# Patient Record
Sex: MALE | Race: WHITE | Employment: FULL TIME | URBAN - METROPOLITAN AREA
[De-identification: names, ages, dates, MRNs, and addresses within clinical notes are randomized per-mention and may not be internally consistent; named-entity substitution may affect disease eponyms.]

---

## 2017-04-20 ENCOUNTER — OFFICE VISIT (OUTPATIENT)
Dept: FAMILY MEDICINE CLINIC | Facility: CLINIC | Age: 47
End: 2017-04-20

## 2017-04-20 VITALS
BODY MASS INDEX: 48.37 KG/M2 | WEIGHT: 308.19 LBS | HEART RATE: 76 BPM | TEMPERATURE: 98 F | HEIGHT: 66.75 IN | SYSTOLIC BLOOD PRESSURE: 160 MMHG | RESPIRATION RATE: 18 BRPM | DIASTOLIC BLOOD PRESSURE: 110 MMHG

## 2017-04-20 DIAGNOSIS — R73.01 IMPAIRED FASTING GLUCOSE: ICD-10-CM

## 2017-04-20 DIAGNOSIS — I10 ESSENTIAL HYPERTENSION WITH GOAL BLOOD PRESSURE LESS THAN 140/90: Primary | ICD-10-CM

## 2017-04-20 DIAGNOSIS — R07.89 LEFT-SIDED CHEST WALL PAIN: ICD-10-CM

## 2017-04-20 DIAGNOSIS — E78.1 HYPERTRIGLYCERIDEMIA: ICD-10-CM

## 2017-04-20 PROCEDURE — 99213 OFFICE O/P EST LOW 20 MIN: CPT | Performed by: FAMILY MEDICINE

## 2017-04-20 RX ORDER — METOPROLOL SUCCINATE 100 MG/1
100 TABLET, EXTENDED RELEASE ORAL DAILY
Qty: 90 TABLET | Refills: 3 | Status: SHIPPED | OUTPATIENT
Start: 2017-04-20 | End: 2018-05-16

## 2017-04-20 RX ORDER — TRAMADOL HYDROCHLORIDE 50 MG/1
50 TABLET ORAL EVERY 6 HOURS PRN
Qty: 30 TABLET | Refills: 1 | Status: SHIPPED | OUTPATIENT
Start: 2017-04-20 | End: 2017-07-06

## 2017-04-21 NOTE — PROGRESS NOTES
Here to follow-up on hypertension. He has been having a pain in the left chest.  It is worse when he rotates his neck or flexes to the left. Has not noticed any rashing or bruising.   There is no shortness of breath but the pain can be worse with taking a uncontrolled #2 left sided chest wall pain with tenderness and increased pain with rotation of the neck. Plans increase metoprolol XL to 100 mg daily. Get some blood pressure readings in 1-2 weeks. Make sure the cuff its improperly.   I do not want to

## 2017-07-06 ENCOUNTER — OFFICE VISIT (OUTPATIENT)
Dept: FAMILY MEDICINE CLINIC | Facility: CLINIC | Age: 47
End: 2017-07-06

## 2017-07-06 VITALS
WEIGHT: 298 LBS | HEART RATE: 74 BPM | SYSTOLIC BLOOD PRESSURE: 138 MMHG | BODY MASS INDEX: 46.77 KG/M2 | RESPIRATION RATE: 16 BRPM | DIASTOLIC BLOOD PRESSURE: 86 MMHG | HEIGHT: 67 IN | TEMPERATURE: 98 F

## 2017-07-06 DIAGNOSIS — R10.9 RIGHT SIDED ABDOMINAL PAIN: Primary | ICD-10-CM

## 2017-07-06 DIAGNOSIS — L82.1 SEBORRHEIC KERATOSIS: ICD-10-CM

## 2017-07-06 DIAGNOSIS — R73.01 IMPAIRED FASTING GLUCOSE: ICD-10-CM

## 2017-07-06 DIAGNOSIS — E78.1 HYPERTRIGLYCERIDEMIA: ICD-10-CM

## 2017-07-06 DIAGNOSIS — I10 ESSENTIAL HYPERTENSION: ICD-10-CM

## 2017-07-06 DIAGNOSIS — Z87.19 H/O DIVERTICULITIS OF COLON: ICD-10-CM

## 2017-07-06 PROCEDURE — 99213 OFFICE O/P EST LOW 20 MIN: CPT | Performed by: FAMILY MEDICINE

## 2017-07-06 RX ORDER — DOXYCYCLINE HYCLATE 100 MG
100 TABLET ORAL 2 TIMES DAILY
Qty: 20 TABLET | Refills: 0 | Status: SHIPPED | COMMUNITY
Start: 2017-07-06 | End: 2018-01-04 | Stop reason: ALTCHOICE

## 2017-07-06 RX ORDER — CIPROFLOXACIN 500 MG/1
500 TABLET, FILM COATED ORAL 2 TIMES DAILY
Qty: 20 TABLET | Refills: 0 | Status: SHIPPED | COMMUNITY
Start: 2017-07-06 | End: 2018-01-04 | Stop reason: ALTCHOICE

## 2017-07-07 ENCOUNTER — TELEPHONE (OUTPATIENT)
Dept: FAMILY MEDICINE CLINIC | Facility: CLINIC | Age: 47
End: 2017-07-07

## 2017-07-07 NOTE — PROGRESS NOTES
Here with an episode of pain yesterday on the lateral right abdomen. Try to have a bowel movement and it was difficult. No blood in the stool. Has not eaten since this episode. Therefore no additional bowel movements today.   Pain is a very mild ache at nontender over the spine. No CVA tenderness. Laying back he did get some pain in a similar area but felt more muscular. Abdomen soft mild tenderness in the right lower quadrant. No rebound or guarding. No masses. Positive bowel sounds.   No hyperactiv

## 2017-07-07 NOTE — TELEPHONE ENCOUNTER
after appt had dinner, had more pain in his side. This morning he had prescription filled and started the antibiotics.  Please call new number [phone] JUANL

## 2017-07-07 NOTE — PATIENT INSTRUCTIONS
Fast 8 hours for labs. Water, black coffee, or plain tea only. Any sugar in the system will alter the glucose level and triglyceride level. Understanding Seborrheic Keratosis  Seborrheic keratoses are wart-like growths on the skin.  These growths are n Seborrheic keratoses are not cancer, but they can look like some types of skin cancer. So it’s a good idea to ask your healthcare provider to check any new skin growths. Ask your healthcare provider about any skin problem that concerns you.   When should I

## 2017-07-18 ENCOUNTER — TELEPHONE (OUTPATIENT)
Dept: FAMILY MEDICINE CLINIC | Facility: CLINIC | Age: 47
End: 2017-07-18

## 2017-07-18 RX ORDER — CIPROFLOXACIN 500 MG/1
500 TABLET, FILM COATED ORAL 2 TIMES DAILY
Qty: 10 TABLET | Refills: 0 | Status: SHIPPED | OUTPATIENT
Start: 2017-07-18 | End: 2017-07-23

## 2017-07-18 NOTE — TELEPHONE ENCOUNTER
Patient states that he was given an antibiotic and is now finished with it and is not feeling all the way better. He would like to know if there is an additional or stronger antibiotic that can be called in. Would like a call back.

## 2017-07-18 NOTE — TELEPHONE ENCOUNTER
See attached message. Last OV RC 7-6-17. Spoke with pt, \"feels pretty good, ABTX have helped significantly\". \"Feels the spot, but not really pain\". No problems with BM, no fever, no nausea. Pt inquired if RC thinks it could be still not cleared up.

## 2018-01-04 ENCOUNTER — OFFICE VISIT (OUTPATIENT)
Dept: FAMILY MEDICINE CLINIC | Facility: CLINIC | Age: 48
End: 2018-01-04

## 2018-01-04 VITALS
BODY MASS INDEX: 48.97 KG/M2 | WEIGHT: 312 LBS | SYSTOLIC BLOOD PRESSURE: 134 MMHG | HEIGHT: 67 IN | RESPIRATION RATE: 18 BRPM | HEART RATE: 68 BPM | OXYGEN SATURATION: 98 % | DIASTOLIC BLOOD PRESSURE: 88 MMHG | TEMPERATURE: 98 F

## 2018-01-04 DIAGNOSIS — S46.812A TRAPEZIUS STRAIN, LEFT, INITIAL ENCOUNTER: Primary | ICD-10-CM

## 2018-01-04 PROCEDURE — 99213 OFFICE O/P EST LOW 20 MIN: CPT | Performed by: FAMILY MEDICINE

## 2018-01-04 RX ORDER — CYCLOBENZAPRINE HCL 5 MG
5 TABLET ORAL 3 TIMES DAILY PRN
Qty: 20 TABLET | Refills: 0 | Status: SHIPPED | OUTPATIENT
Start: 2018-01-04 | End: 2018-01-11

## 2018-01-04 NOTE — PROGRESS NOTES
7 days of pains going down the left arm. He cannot hold pressure and make them feel better. He works at a desk job. Drives about an hour. There is been no trauma. He denies headaches or dizziness.   There is been no numbness tingling or weakness in the arm.    Plans cyclobenzaprine 5 mg 3 times a day. See how he tolerates it tonight. If he is at all drowsy do not take it during the day while he has to drive. Instead take 2 at bedtime the next 3 nights. Let me know on Monday how he is doing.   If he is

## 2018-01-04 NOTE — PATIENT INSTRUCTIONS
Try the first dose tonight, if it makes you drowsy do not take them during the day while driving and instead take 2 tomorrow night. If the schedule allows take them 3 times a day Saturday and Sunday.   If your schedule does not allow take them again 2 tabl

## 2018-05-16 ENCOUNTER — TELEPHONE (OUTPATIENT)
Dept: FAMILY MEDICINE CLINIC | Facility: CLINIC | Age: 48
End: 2018-05-16

## 2018-05-16 DIAGNOSIS — R73.01 IMPAIRED FASTING GLUCOSE: Primary | ICD-10-CM

## 2018-05-16 DIAGNOSIS — I10 ESSENTIAL HYPERTENSION: ICD-10-CM

## 2018-05-16 DIAGNOSIS — I10 ESSENTIAL HYPERTENSION WITH GOAL BLOOD PRESSURE LESS THAN 140/90: ICD-10-CM

## 2018-05-16 RX ORDER — METOPROLOL SUCCINATE 100 MG/1
100 TABLET, EXTENDED RELEASE ORAL DAILY
Qty: 30 TABLET | Refills: 0 | Status: SHIPPED | OUTPATIENT
Start: 2018-05-16 | End: 2018-06-29

## 2018-05-16 NOTE — TELEPHONE ENCOUNTER
Scheduled pt for 18. Sent in lab orders that  to [address], fasting instructions given. Sent 30 day RX to pt's pharmacy. Task completed.

## 2018-05-16 NOTE — TELEPHONE ENCOUNTER
Has no more refills on his blood pressure med. Does he need to come in for an appt? Does he need blood work before an appt if one is needed?   He uses [facility] if he can have refills

## 2018-05-17 ENCOUNTER — APPOINTMENT (OUTPATIENT)
Dept: LAB | Age: 48
End: 2018-05-17
Attending: FAMILY MEDICINE
Payer: COMMERCIAL

## 2018-05-17 DIAGNOSIS — I10 ESSENTIAL HYPERTENSION WITH GOAL BLOOD PRESSURE LESS THAN 140/90: ICD-10-CM

## 2018-05-17 DIAGNOSIS — R73.01 IMPAIRED FASTING GLUCOSE: ICD-10-CM

## 2018-05-17 PROCEDURE — 80053 COMPREHEN METABOLIC PANEL: CPT | Performed by: FAMILY MEDICINE

## 2018-05-17 PROCEDURE — 80061 LIPID PANEL: CPT | Performed by: FAMILY MEDICINE

## 2018-05-17 PROCEDURE — 83036 HEMOGLOBIN GLYCOSYLATED A1C: CPT | Performed by: FAMILY MEDICINE

## 2018-05-18 ENCOUNTER — TELEPHONE (OUTPATIENT)
Dept: FAMILY MEDICINE CLINIC | Facility: CLINIC | Age: 48
End: 2018-05-18

## 2018-05-18 DIAGNOSIS — R79.89 ELEVATED SERUM CREATININE: Primary | ICD-10-CM

## 2018-05-18 NOTE — TELEPHONE ENCOUNTER
----- Message from Praveen Alfaro MD sent at 5/18/2018 11:19 AM CDT -----  3 month sugar test consistent with impaired fasting glucose. His glucose and triglycerides are improved. His kidney function has bumped way up.   Ask him to repeat the CMP before

## 2018-06-27 ENCOUNTER — APPOINTMENT (OUTPATIENT)
Dept: LAB | Age: 48
End: 2018-06-27
Attending: FAMILY MEDICINE
Payer: COMMERCIAL

## 2018-06-27 DIAGNOSIS — R79.89 ELEVATED SERUM CREATININE: ICD-10-CM

## 2018-06-27 PROCEDURE — 80053 COMPREHEN METABOLIC PANEL: CPT | Performed by: FAMILY MEDICINE

## 2018-06-27 PROCEDURE — 36415 COLL VENOUS BLD VENIPUNCTURE: CPT | Performed by: FAMILY MEDICINE

## 2018-06-29 ENCOUNTER — OFFICE VISIT (OUTPATIENT)
Dept: FAMILY MEDICINE CLINIC | Facility: CLINIC | Age: 48
End: 2018-06-29

## 2018-06-29 VITALS
SYSTOLIC BLOOD PRESSURE: 146 MMHG | DIASTOLIC BLOOD PRESSURE: 86 MMHG | OXYGEN SATURATION: 97 % | HEIGHT: 67 IN | HEART RATE: 91 BPM | BODY MASS INDEX: 46.77 KG/M2 | TEMPERATURE: 98 F | RESPIRATION RATE: 20 BRPM | WEIGHT: 298 LBS

## 2018-06-29 DIAGNOSIS — R73.01 IMPAIRED FASTING GLUCOSE: ICD-10-CM

## 2018-06-29 DIAGNOSIS — I10 ESSENTIAL HYPERTENSION: Primary | ICD-10-CM

## 2018-06-29 DIAGNOSIS — E78.1 HYPERTRIGLYCERIDEMIA: ICD-10-CM

## 2018-06-29 DIAGNOSIS — R79.89 ELEVATED SERUM CREATININE: ICD-10-CM

## 2018-06-29 PROCEDURE — 99213 OFFICE O/P EST LOW 20 MIN: CPT | Performed by: FAMILY MEDICINE

## 2018-06-29 RX ORDER — METOPROLOL TARTRATE AND HYDROCHLOROTHIAZIDE 100; 25 MG/1; MG/1
1 TABLET ORAL DAILY
Qty: 90 TABLET | Refills: 3 | Status: SHIPPED | OUTPATIENT
Start: 2018-06-29 | End: 2019-06-30

## 2018-06-29 NOTE — PATIENT INSTRUCTIONS
Labs Late Sept or early October. No fasting required. Water pill may cause increased urination for a few weeks.     Goal less than 140/90

## 2018-06-29 NOTE — PROGRESS NOTES
Here for follow-up of hypertension. Had labs done a month ago. Creatinine was 1.8. Had labs repeated 2 days ago. Creatinine down to 1.7. He denies excessive headaches or dizziness. No chest pain or heart palpitations.   He has been rationing the [employer]

## 2018-10-01 ENCOUNTER — APPOINTMENT (OUTPATIENT)
Dept: LAB | Age: 48
End: 2018-10-01
Attending: FAMILY MEDICINE
Payer: COMMERCIAL

## 2018-10-01 DIAGNOSIS — I10 ESSENTIAL HYPERTENSION: ICD-10-CM

## 2018-10-01 DIAGNOSIS — R73.01 IMPAIRED FASTING GLUCOSE: ICD-10-CM

## 2018-10-01 DIAGNOSIS — E78.1 HYPERTRIGLYCERIDEMIA: ICD-10-CM

## 2018-10-01 PROCEDURE — 83036 HEMOGLOBIN GLYCOSYLATED A1C: CPT | Performed by: FAMILY MEDICINE

## 2018-10-01 PROCEDURE — 36415 COLL VENOUS BLD VENIPUNCTURE: CPT | Performed by: FAMILY MEDICINE

## 2018-10-01 PROCEDURE — 80053 COMPREHEN METABOLIC PANEL: CPT | Performed by: FAMILY MEDICINE

## 2018-12-14 ENCOUNTER — OFFICE VISIT (OUTPATIENT)
Dept: FAMILY MEDICINE CLINIC | Facility: CLINIC | Age: 48
End: 2018-12-14
Payer: COMMERCIAL

## 2018-12-14 VITALS
BODY MASS INDEX: 46.46 KG/M2 | HEART RATE: 73 BPM | WEIGHT: 296 LBS | SYSTOLIC BLOOD PRESSURE: 120 MMHG | OXYGEN SATURATION: 98 % | RESPIRATION RATE: 18 BRPM | DIASTOLIC BLOOD PRESSURE: 88 MMHG | HEIGHT: 67 IN

## 2018-12-14 DIAGNOSIS — E78.1 HYPERTRIGLYCERIDEMIA: ICD-10-CM

## 2018-12-14 DIAGNOSIS — R94.31 ABNORMAL EKG: ICD-10-CM

## 2018-12-14 DIAGNOSIS — I10 ESSENTIAL HYPERTENSION: ICD-10-CM

## 2018-12-14 DIAGNOSIS — R07.89 PRESSURE IN LEFT SIDE OF CHEST: Primary | ICD-10-CM

## 2018-12-14 PROCEDURE — 93000 ELECTROCARDIOGRAM COMPLETE: CPT | Performed by: FAMILY MEDICINE

## 2018-12-14 PROCEDURE — 99213 OFFICE O/P EST LOW 20 MIN: CPT | Performed by: FAMILY MEDICINE

## 2018-12-14 NOTE — PATIENT INSTRUCTIONS
Start aspirin 81 mg first dose tonight. Nuclear stress test.    If you develop any additional chest pressure, sensation of feeling off in the head, or left arm pain; you should proceed to the ER immediately.

## 2018-12-17 ENCOUNTER — HOSPITAL ENCOUNTER (OUTPATIENT)
Dept: CV DIAGNOSTICS | Facility: HOSPITAL | Age: 48
Discharge: HOME | End: 2018-12-17
Attending: FAMILY MEDICINE
Payer: COMMERCIAL

## 2018-12-17 DIAGNOSIS — I10 ESSENTIAL HYPERTENSION: ICD-10-CM

## 2018-12-17 DIAGNOSIS — R07.89 PRESSURE IN LEFT SIDE OF CHEST: ICD-10-CM

## 2018-12-17 DIAGNOSIS — E78.1 HYPERTRIGLYCERIDEMIA: ICD-10-CM

## 2018-12-17 DIAGNOSIS — R94.31 ABNORMAL EKG: ICD-10-CM

## 2018-12-17 PROCEDURE — 78452 HT MUSCLE IMAGE SPECT MULT: CPT | Performed by: FAMILY MEDICINE

## 2018-12-17 PROCEDURE — 93017 CV STRESS TEST TRACING ONLY: CPT | Performed by: FAMILY MEDICINE

## 2018-12-17 PROCEDURE — 93018 CV STRESS TEST I&R ONLY: CPT | Performed by: FAMILY MEDICINE

## 2018-12-20 ENCOUNTER — TELEPHONE (OUTPATIENT)
Dept: FAMILY MEDICINE CLINIC | Facility: CLINIC | Age: 48
End: 2018-12-20

## 2018-12-20 NOTE — TELEPHONE ENCOUNTER
Pt inquiring about NM Stress Test results. Advised pt final report not yet received, read by [facility] and this is normal time range for getting results. Advised with route to [facility] to inform. Results only transcribed, not finalized.

## 2018-12-20 NOTE — TELEPHONE ENCOUNTER
Pt informed of both test results and recommendations and he expressed understanding and agreement. Gave contact information for the Cardiac practice. Task completed.

## 2018-12-20 NOTE — TELEPHONE ENCOUNTER
Stress test is normal.  Given his symptoms and ekg changes, I would like him to see cardiology,  Jessica Bob, etc.

## 2018-12-21 ENCOUNTER — PATIENT MESSAGE (OUTPATIENT)
Dept: FAMILY MEDICINE CLINIC | Facility: CLINIC | Age: 48
End: 2018-12-21

## 2018-12-21 NOTE — TELEPHONE ENCOUNTER
From: Tj Verdin  To: Marcos Herndon MD  Sent: 12/21/2018 1:58 PM CST  Subject: Non-Urgent Medical Question    My wife is convinced that I have sleep apnea, and feels strongly that I need this condition addressed.   She thinks it may be a contributin

## 2019-06-30 DIAGNOSIS — I10 ESSENTIAL HYPERTENSION: ICD-10-CM

## 2019-07-01 RX ORDER — METOPROLOL TARTRATE AND HYDROCHLOROTHIAZIDE 100; 25 MG/1; MG/1
TABLET ORAL
Qty: 90 TABLET | Refills: 0 | Status: SHIPPED | OUTPATIENT
Start: 2019-07-01 | End: 2019-10-07

## 2019-09-06 ENCOUNTER — OFFICE VISIT (OUTPATIENT)
Dept: FAMILY MEDICINE CLINIC | Facility: CLINIC | Age: 49
End: 2019-09-06
Payer: COMMERCIAL

## 2019-09-06 VITALS
SYSTOLIC BLOOD PRESSURE: 120 MMHG | RESPIRATION RATE: 18 BRPM | OXYGEN SATURATION: 98 % | WEIGHT: 299 LBS | DIASTOLIC BLOOD PRESSURE: 80 MMHG | HEART RATE: 87 BPM | HEIGHT: 67 IN | BODY MASS INDEX: 46.93 KG/M2

## 2019-09-06 DIAGNOSIS — I10 ESSENTIAL HYPERTENSION: Primary | ICD-10-CM

## 2019-09-06 DIAGNOSIS — R06.83 SNORING: ICD-10-CM

## 2019-09-06 DIAGNOSIS — R06.81 APNEA: ICD-10-CM

## 2019-09-06 PROCEDURE — 99213 OFFICE O/P EST LOW 20 MIN: CPT | Performed by: FAMILY MEDICINE

## 2019-09-06 NOTE — PROGRESS NOTES
Wife is concerned about snoring and apnea spells. He is treated for hypertension which is why he came in today. He does have daytime drowsiness. Blood pressures been under good control. No complaints of side effects.     PAST MEDICAL HISTORY:  Past Me

## 2019-09-28 ENCOUNTER — OFFICE VISIT (OUTPATIENT)
Dept: SLEEP CENTER | Age: 49
End: 2019-09-28
Attending: INTERNAL MEDICINE
Payer: COMMERCIAL

## 2019-09-28 DIAGNOSIS — R06.81 APNEA: ICD-10-CM

## 2019-09-28 DIAGNOSIS — R06.83 SNORING: ICD-10-CM

## 2019-09-28 DIAGNOSIS — I10 ESSENTIAL HYPERTENSION: ICD-10-CM

## 2019-09-28 PROCEDURE — 95811 POLYSOM 6/>YRS CPAP 4/> PARM: CPT

## 2019-10-07 DIAGNOSIS — I10 ESSENTIAL HYPERTENSION: ICD-10-CM

## 2019-10-07 PROBLEM — G47.30 SLEEP APNEA IN ADULT: Status: ACTIVE | Noted: 2019-10-07

## 2019-10-07 RX ORDER — METOPROLOL TARTRATE AND HYDROCHLOROTHIAZIDE 100; 25 MG/1; MG/1
TABLET ORAL
Qty: 90 TABLET | Refills: 0 | Status: SHIPPED | OUTPATIENT
Start: 2019-10-07 | End: 2019-12-30

## 2019-12-30 DIAGNOSIS — I10 ESSENTIAL HYPERTENSION: ICD-10-CM

## 2019-12-30 RX ORDER — METOPROLOL TARTRATE AND HYDROCHLOROTHIAZIDE 100; 25 MG/1; MG/1
TABLET ORAL
Qty: 90 TABLET | Refills: 0 | Status: SHIPPED | OUTPATIENT
Start: 2019-12-30 | End: 2020-06-29

## 2020-01-02 RX ORDER — HYDROCHLOROTHIAZIDE 25 MG/1
25 TABLET ORAL DAILY
Qty: 90 TABLET | Refills: 0 | Status: SHIPPED | OUTPATIENT
Start: 2020-01-02 | End: 2020-03-27

## 2020-03-27 RX ORDER — HYDROCHLOROTHIAZIDE 25 MG/1
TABLET ORAL
Qty: 90 TABLET | Refills: 0 | Status: SHIPPED | OUTPATIENT
Start: 2020-03-27 | End: 2020-06-18

## 2020-06-18 RX ORDER — HYDROCHLOROTHIAZIDE 25 MG/1
TABLET ORAL
Qty: 90 TABLET | Refills: 0 | Status: SHIPPED | OUTPATIENT
Start: 2020-06-18 | End: 2020-09-18

## 2020-06-24 RX ORDER — METOPROLOL SUCCINATE 100 MG/1
TABLET, EXTENDED RELEASE ORAL
Qty: 90 TABLET | Refills: 1 | Status: SHIPPED | OUTPATIENT
Start: 2020-06-24 | End: 2020-12-15

## 2020-06-29 ENCOUNTER — VIRTUAL PHONE E/M (OUTPATIENT)
Dept: FAMILY MEDICINE CLINIC | Facility: CLINIC | Age: 50
End: 2020-06-29
Payer: COMMERCIAL

## 2020-06-29 DIAGNOSIS — I10 ESSENTIAL HYPERTENSION: Primary | ICD-10-CM

## 2020-06-29 DIAGNOSIS — E78.1 HYPERTRIGLYCERIDEMIA: ICD-10-CM

## 2020-06-29 DIAGNOSIS — R73.01 IMPAIRED FASTING GLUCOSE: ICD-10-CM

## 2020-06-29 PROCEDURE — 99213 OFFICE O/P EST LOW 20 MIN: CPT | Performed by: FAMILY MEDICINE

## 2020-06-29 NOTE — PROGRESS NOTES
Virtual Telephone Check-In    Rafa Mayers verbally consents to a Virtual/Telephone Check-In visit on 06/29/20. Patient understands and accepts financial responsibility for any deductible, co-insurance and/or co-pays associated with this service. visit:    Essential hypertension  -     COMP METABOLIC PANEL (14); Future  Continue metoprolol. Discontinue hydrochlorothiazide due to gout flareups every 3 weeks. Start checking blood pressures after 3 or 4 days. Goal to stay below 140/90.   Stress test

## 2020-07-02 ENCOUNTER — APPOINTMENT (OUTPATIENT)
Dept: LAB | Age: 50
End: 2020-07-02
Attending: FAMILY MEDICINE
Payer: COMMERCIAL

## 2020-07-02 DIAGNOSIS — I10 ESSENTIAL HYPERTENSION: ICD-10-CM

## 2020-07-02 DIAGNOSIS — R73.01 IMPAIRED FASTING GLUCOSE: ICD-10-CM

## 2020-07-02 DIAGNOSIS — E78.1 HYPERTRIGLYCERIDEMIA: ICD-10-CM

## 2020-07-02 LAB
ALBUMIN SERPL-MCNC: 3.4 G/DL (ref 3.4–5)
ALBUMIN/GLOB SERPL: 0.9 {RATIO} (ref 1–2)
ALP LIVER SERPL-CCNC: 72 U/L (ref 45–117)
ALT SERPL-CCNC: 19 U/L (ref 16–61)
ANION GAP SERPL CALC-SCNC: 5 MMOL/L (ref 0–18)
AST SERPL-CCNC: 17 U/L (ref 15–37)
BILIRUB SERPL-MCNC: 0.6 MG/DL (ref 0.1–2)
BUN BLD-MCNC: 17 MG/DL (ref 7–18)
BUN/CREAT SERPL: 10.1 (ref 10–20)
CALCIUM BLD-MCNC: 8.6 MG/DL (ref 8.5–10.1)
CHLORIDE SERPL-SCNC: 112 MMOL/L (ref 98–112)
CHOLEST SMN-MCNC: 188 MG/DL (ref ?–200)
CO2 SERPL-SCNC: 26 MMOL/L (ref 21–32)
CREAT BLD-MCNC: 1.69 MG/DL (ref 0.7–1.3)
EST. AVERAGE GLUCOSE BLD GHB EST-MCNC: 123 MG/DL (ref 68–126)
GLOBULIN PLAS-MCNC: 3.8 G/DL (ref 2.8–4.4)
GLUCOSE BLD-MCNC: 103 MG/DL (ref 70–99)
HBA1C MFR BLD HPLC: 5.9 % (ref ?–5.7)
HDLC SERPL-MCNC: 27 MG/DL (ref 40–59)
LDLC SERPL CALC-MCNC: 129 MG/DL (ref ?–100)
M PROTEIN MFR SERPL ELPH: 7.2 G/DL (ref 6.4–8.2)
NONHDLC SERPL-MCNC: 161 MG/DL (ref ?–130)
OSMOLALITY SERPL CALC.SUM OF ELEC: 298 MOSM/KG (ref 275–295)
PATIENT FASTING Y/N/NP: YES
PATIENT FASTING Y/N/NP: YES
POTASSIUM SERPL-SCNC: 3.8 MMOL/L (ref 3.5–5.1)
SODIUM SERPL-SCNC: 143 MMOL/L (ref 136–145)
TRIGL SERPL-MCNC: 160 MG/DL (ref 30–149)
VLDLC SERPL CALC-MCNC: 32 MG/DL (ref 0–30)

## 2020-07-02 PROCEDURE — 83036 HEMOGLOBIN GLYCOSYLATED A1C: CPT | Performed by: FAMILY MEDICINE

## 2020-07-02 PROCEDURE — 80061 LIPID PANEL: CPT | Performed by: FAMILY MEDICINE

## 2020-07-02 PROCEDURE — 36415 COLL VENOUS BLD VENIPUNCTURE: CPT | Performed by: FAMILY MEDICINE

## 2020-07-02 PROCEDURE — 80053 COMPREHEN METABOLIC PANEL: CPT | Performed by: FAMILY MEDICINE

## 2020-09-18 RX ORDER — HYDROCHLOROTHIAZIDE 25 MG/1
TABLET ORAL
Qty: 90 TABLET | Refills: 0 | Status: SHIPPED | OUTPATIENT
Start: 2020-09-18 | End: 2020-12-15

## 2020-12-12 ENCOUNTER — APPOINTMENT (OUTPATIENT)
Dept: GENERAL RADIOLOGY | Age: 50
End: 2020-12-12
Attending: NURSE PRACTITIONER
Payer: COMMERCIAL

## 2020-12-12 ENCOUNTER — HOSPITAL ENCOUNTER (OUTPATIENT)
Age: 50
Discharge: HOME | End: 2020-12-12
Attending: EMERGENCY MEDICINE
Payer: COMMERCIAL

## 2020-12-12 VITALS
DIASTOLIC BLOOD PRESSURE: 100 MMHG | RESPIRATION RATE: 20 BRPM | HEART RATE: 78 BPM | TEMPERATURE: 97 F | OXYGEN SATURATION: 97 % | SYSTOLIC BLOOD PRESSURE: 154 MMHG

## 2020-12-12 DIAGNOSIS — M54.12 CERVICAL RADICULOPATHY: Primary | ICD-10-CM

## 2020-12-12 PROCEDURE — 36415 COLL VENOUS BLD VENIPUNCTURE: CPT

## 2020-12-12 PROCEDURE — 93010 ELECTROCARDIOGRAM REPORT: CPT

## 2020-12-12 PROCEDURE — 99205 OFFICE O/P NEW HI 60 MIN: CPT

## 2020-12-12 PROCEDURE — 93005 ELECTROCARDIOGRAM TRACING: CPT

## 2020-12-12 PROCEDURE — 84484 ASSAY OF TROPONIN QUANT: CPT

## 2020-12-12 PROCEDURE — 72050 X-RAY EXAM NECK SPINE 4/5VWS: CPT | Performed by: NURSE PRACTITIONER

## 2020-12-12 PROCEDURE — 99215 OFFICE O/P EST HI 40 MIN: CPT

## 2020-12-12 RX ORDER — METHYLPREDNISOLONE 4 MG/1
TABLET ORAL
Qty: 1 PACKAGE | Refills: 0 | Status: SHIPPED | OUTPATIENT
Start: 2020-12-12 | End: 2021-06-24 | Stop reason: ALTCHOICE

## 2020-12-12 RX ORDER — CYCLOBENZAPRINE HCL 10 MG
10 TABLET ORAL 3 TIMES DAILY PRN
Qty: 20 TABLET | Refills: 0 | Status: SHIPPED | OUTPATIENT
Start: 2020-12-12 | End: 2020-12-19

## 2020-12-12 NOTE — ED PROVIDER NOTES
For 2 or 3 days, patient has had neck pain rating down his left arm. Patient states that he has had difficulty finding a comfortable position. He notes both flexion and extension of his neck because of the discomfort to be worse.   This morning, he was fe to Lexiscan infusion. 3. Normal LVEF 63%, no wall motion abnormalities. 4. Normal heart rate and blood pressure response. 5. No arrhythmias.      The radicular pain in his neck suggests the possibility of cervical disc disease    X-ray C-spine  CONCLUS

## 2020-12-12 NOTE — ED INITIAL ASSESSMENT (HPI)
Left side neck and left shoulder - pain started Wednesday worse for the past  2 days, could not get a comfortable position and cannot sleep. Takes ibuprofen and aspirin but no relief. Denies any injury,    Left arm pain  - neck pain radiates to left arm .

## 2020-12-12 NOTE — ED PROVIDER NOTES
Patient Seen in: [facility]      History   Patient presents with:  Neck Pain  Shoulder Pain    Stated Complaint: NECK/SHOULDER PAIN X 2 DAYS    HPI  71-year-old male presents to the immediate care complaining of 2 to 3 days of neck pain t ill-appearing or toxic-appearing. HENT:      Head: Normocephalic and atraumatic. Eyes:      Conjunctiva/sclera: Conjunctivae normal.   Neck:      Musculoskeletal: Normal range of motion and neck supple.  Muscular tenderness (Left sided paraspinal into t by (CST): Silvia Camara MD on 12/12/2020 at 10:38 AM         MDM      Patient appears to have cervical radiculopathy at this time. There is no numbness or weakness to the left arm.   Patient will be given steroids along with muscle relaxants and instructed on

## 2020-12-15 ENCOUNTER — TELEMEDICINE (OUTPATIENT)
Dept: FAMILY MEDICINE CLINIC | Facility: CLINIC | Age: 50
End: 2020-12-15
Payer: COMMERCIAL

## 2020-12-15 DIAGNOSIS — I10 ESSENTIAL HYPERTENSION: ICD-10-CM

## 2020-12-15 DIAGNOSIS — M47.812 ARTHRITIS OF NECK: Primary | ICD-10-CM

## 2020-12-15 DIAGNOSIS — M51.9 INTERVERTEBRAL DISK DISEASE: ICD-10-CM

## 2020-12-15 PROCEDURE — 99213 OFFICE O/P EST LOW 20 MIN: CPT | Performed by: FAMILY MEDICINE

## 2020-12-15 RX ORDER — METOPROLOL SUCCINATE 100 MG/1
100 TABLET, EXTENDED RELEASE ORAL DAILY
Qty: 90 TABLET | Refills: 1 | Status: SHIPPED | OUTPATIENT
Start: 2020-12-15 | End: 2020-12-21

## 2020-12-15 NOTE — PROGRESS NOTES
Virtual Telephone Check-In    Rafa Mayers verbally consents to a Virtual/Telephone Check-In visit on 12/15/20. Patient understands and accepts financial responsibility for any deductible, co-insurance and/or co-pays associated with this service. Lisinopril  FAMILY HISTORY  Family History   Problem Relation Age of Onset   • Diabetes Paternal Grandmother    • Heart Disorder Paternal Grandfather    • Hypertension Father        PHYSICAL EXAM:  There were no vitals taken for this visit.   No conversatio

## 2020-12-19 DIAGNOSIS — I10 ESSENTIAL HYPERTENSION: ICD-10-CM

## 2020-12-21 ENCOUNTER — PATIENT MESSAGE (OUTPATIENT)
Dept: FAMILY MEDICINE CLINIC | Facility: CLINIC | Age: 50
End: 2020-12-21

## 2020-12-21 DIAGNOSIS — M50.30 DEGENERATIVE DISC DISEASE, CERVICAL: Primary | ICD-10-CM

## 2020-12-21 RX ORDER — METOPROLOL SUCCINATE 100 MG/1
TABLET, EXTENDED RELEASE ORAL
Qty: 90 TABLET | Refills: 1 | Status: SHIPPED | OUTPATIENT
Start: 2020-12-21 | End: 2021-12-06

## 2020-12-21 RX ORDER — NABUMETONE 500 MG/1
500 TABLET, FILM COATED ORAL 2 TIMES DAILY
Qty: 20 TABLET | Refills: 0 | Status: SHIPPED | OUTPATIENT
Start: 2020-12-21 | End: 2021-06-24 | Stop reason: ALTCHOICE

## 2020-12-21 NOTE — TELEPHONE ENCOUNTER
Start Relafen 500 mg twice a day for 10 days. MRI cervical spine for the diagnosis of degenerative disc disease.

## 2020-12-21 NOTE — TELEPHONE ENCOUNTER
From: Mireya Verdin  To: Deidre Brady MD  Sent: 12/21/2020 8:12 AM CST  Subject: Other    on our call last week you asked me to report back to you once a few days had gone by since i used up the steroid prescription.   the pain is definitely not as sev

## 2020-12-28 ENCOUNTER — HOSPITAL ENCOUNTER (OUTPATIENT)
Dept: MRI IMAGING | Facility: HOSPITAL | Age: 50
Discharge: HOME | End: 2020-12-28
Attending: FAMILY MEDICINE
Payer: COMMERCIAL

## 2020-12-28 DIAGNOSIS — M50.30 DEGENERATIVE DISC DISEASE, CERVICAL: ICD-10-CM

## 2020-12-28 PROCEDURE — 72141 MRI NECK SPINE W/O DYE: CPT | Performed by: FAMILY MEDICINE

## 2020-12-29 PROBLEM — M50.223 HERNIATED NUCLEUS PULPOSUS, C6-7 LEFT: Status: ACTIVE | Noted: 2020-12-15

## 2021-06-24 ENCOUNTER — OFFICE VISIT (OUTPATIENT)
Dept: FAMILY MEDICINE CLINIC | Facility: CLINIC | Age: 51
End: 2021-06-24
Payer: COMMERCIAL

## 2021-06-24 VITALS
HEIGHT: 67 IN | WEIGHT: 194 LBS | BODY MASS INDEX: 30.45 KG/M2 | RESPIRATION RATE: 16 BRPM | SYSTOLIC BLOOD PRESSURE: 156 MMHG | DIASTOLIC BLOOD PRESSURE: 106 MMHG | OXYGEN SATURATION: 98 % | HEART RATE: 64 BPM

## 2021-06-24 DIAGNOSIS — I10 ESSENTIAL HYPERTENSION: ICD-10-CM

## 2021-06-24 DIAGNOSIS — Z12.11 SCREEN FOR COLON CANCER: ICD-10-CM

## 2021-06-24 DIAGNOSIS — R79.89 ELEVATED SERUM CREATININE: ICD-10-CM

## 2021-06-24 DIAGNOSIS — Z23 NEED FOR DIPHTHERIA-TETANUS-PERTUSSIS (TDAP) VACCINE: ICD-10-CM

## 2021-06-24 DIAGNOSIS — Z00.00 ROUTINE GENERAL MEDICAL EXAMINATION AT A HEALTH CARE FACILITY: Primary | ICD-10-CM

## 2021-06-24 DIAGNOSIS — R73.01 IMPAIRED FASTING GLUCOSE: ICD-10-CM

## 2021-06-24 DIAGNOSIS — Z23 NEED FOR SHINGLES VACCINE: ICD-10-CM

## 2021-06-24 DIAGNOSIS — E78.1 HYPERTRIGLYCERIDEMIA: ICD-10-CM

## 2021-06-24 PROCEDURE — 3008F BODY MASS INDEX DOCD: CPT | Performed by: FAMILY MEDICINE

## 2021-06-24 PROCEDURE — 90750 HZV VACC RECOMBINANT IM: CPT | Performed by: FAMILY MEDICINE

## 2021-06-24 PROCEDURE — 90715 TDAP VACCINE 7 YRS/> IM: CPT | Performed by: FAMILY MEDICINE

## 2021-06-24 PROCEDURE — 3077F SYST BP >= 140 MM HG: CPT | Performed by: FAMILY MEDICINE

## 2021-06-24 PROCEDURE — 90472 IMMUNIZATION ADMIN EACH ADD: CPT | Performed by: FAMILY MEDICINE

## 2021-06-24 PROCEDURE — 3080F DIAST BP >= 90 MM HG: CPT | Performed by: FAMILY MEDICINE

## 2021-06-24 PROCEDURE — 90471 IMMUNIZATION ADMIN: CPT | Performed by: FAMILY MEDICINE

## 2021-06-24 PROCEDURE — 99396 PREV VISIT EST AGE 40-64: CPT | Performed by: FAMILY MEDICINE

## 2021-06-24 NOTE — PATIENT INSTRUCTIONS
Please call [phone] or use [email or web address] to schedule for laboratories or radiology procedures. Fast 8 hours for labs. Water, black coffee, or plain tea only. Any sugar in the system will alter the glucose level and triglyceride level.     Due to the 2

## 2021-06-24 NOTE — PROGRESS NOTES
HPI:  Here for a physical.    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 No complaints of any new constipation or diarrhea. No complaints of abdominal pain or cramping. Regular stools.   GENITOURINARY: No complaints of dysuria, urgency or frequency, no erectile dysfunction  MUSCULOSKELETAL: No complaint supraclavicular areas, axilla, and inguinal areas. EXTREMITIES / MUSCULOSKELETAL: 4 extremities with grossly normal ROM. Gait NL. No cyanosis, clubbing or edema. NEUROLOGIC: CN's II-XII grossly intact, DTR's 2+/4+ throughout. Strength 5+/5+ x 4 ext.  Aler

## 2021-07-08 ENCOUNTER — LAB ENCOUNTER (OUTPATIENT)
Dept: LAB | Age: 51
End: 2021-07-08
Attending: FAMILY MEDICINE
Payer: COMMERCIAL

## 2021-07-08 DIAGNOSIS — E78.1 HYPERTRIGLYCERIDEMIA: ICD-10-CM

## 2021-07-08 DIAGNOSIS — Z00.00 ROUTINE GENERAL MEDICAL EXAMINATION AT A HEALTH CARE FACILITY: ICD-10-CM

## 2021-07-08 DIAGNOSIS — I10 ESSENTIAL HYPERTENSION: ICD-10-CM

## 2021-07-08 DIAGNOSIS — R73.01 IMPAIRED FASTING GLUCOSE: ICD-10-CM

## 2021-07-08 DIAGNOSIS — R79.89 ELEVATED SERUM CREATININE: ICD-10-CM

## 2021-07-08 LAB
ALBUMIN SERPL-MCNC: 3.4 G/DL (ref 3.4–5)
ALBUMIN/GLOB SERPL: 0.9 {RATIO} (ref 1–2)
ALP LIVER SERPL-CCNC: 103 U/L
ALT SERPL-CCNC: 20 U/L
ANION GAP SERPL CALC-SCNC: 8 MMOL/L (ref 0–18)
AST SERPL-CCNC: 12 U/L (ref 15–37)
BILIRUB SERPL-MCNC: 0.4 MG/DL (ref 0.1–2)
BUN BLD-MCNC: 13 MG/DL (ref 7–18)
BUN/CREAT SERPL: 8.5 (ref 10–20)
CALCIUM BLD-MCNC: 8.8 MG/DL (ref 8.5–10.1)
CHLORIDE SERPL-SCNC: 111 MMOL/L (ref 98–112)
CHOLEST SMN-MCNC: 225 MG/DL (ref ?–200)
CO2 SERPL-SCNC: 23 MMOL/L (ref 21–32)
COMPLEXED PSA SERPL-MCNC: 2.48 NG/ML (ref ?–4)
CREAT BLD-MCNC: 1.53 MG/DL
EST. AVERAGE GLUCOSE BLD GHB EST-MCNC: 123 MG/DL (ref 68–126)
GLOBULIN PLAS-MCNC: 3.9 G/DL (ref 2.8–4.4)
GLUCOSE BLD-MCNC: 100 MG/DL (ref 70–99)
HBA1C MFR BLD HPLC: 5.9 % (ref ?–5.7)
HDLC SERPL-MCNC: 36 MG/DL (ref 40–59)
LDLC SERPL CALC-MCNC: 161 MG/DL (ref ?–100)
M PROTEIN MFR SERPL ELPH: 7.3 G/DL (ref 6.4–8.2)
NONHDLC SERPL-MCNC: 189 MG/DL (ref ?–130)
OSMOLALITY SERPL CALC.SUM OF ELEC: 294 MOSM/KG (ref 275–295)
PATIENT FASTING Y/N/NP: YES
PATIENT FASTING Y/N/NP: YES
POTASSIUM SERPL-SCNC: 3.5 MMOL/L (ref 3.5–5.1)
SODIUM SERPL-SCNC: 142 MMOL/L (ref 136–145)
TRIGL SERPL-MCNC: 155 MG/DL (ref 30–149)
VLDLC SERPL CALC-MCNC: 30 MG/DL (ref 0–30)

## 2021-07-08 PROCEDURE — 80053 COMPREHEN METABOLIC PANEL: CPT | Performed by: FAMILY MEDICINE

## 2021-07-08 PROCEDURE — 84153 ASSAY OF PSA TOTAL: CPT | Performed by: FAMILY MEDICINE

## 2021-07-08 PROCEDURE — 83036 HEMOGLOBIN GLYCOSYLATED A1C: CPT | Performed by: FAMILY MEDICINE

## 2021-07-08 PROCEDURE — 80061 LIPID PANEL: CPT | Performed by: FAMILY MEDICINE

## 2021-07-30 ENCOUNTER — HOSPITAL ENCOUNTER (OUTPATIENT)
Facility: HOSPITAL | Age: 51
Setting detail: HOSPITAL OUTPATIENT SURGERY
Discharge: HOME | End: 2021-07-30
Attending: INTERNAL MEDICINE | Admitting: INTERNAL MEDICINE
Payer: COMMERCIAL

## 2021-07-30 ENCOUNTER — ANESTHESIA (OUTPATIENT)
Dept: ENDOSCOPY | Facility: HOSPITAL | Age: 51
End: 2021-07-30
Payer: COMMERCIAL

## 2021-07-30 ENCOUNTER — ANESTHESIA EVENT (OUTPATIENT)
Dept: ENDOSCOPY | Facility: HOSPITAL | Age: 51
End: 2021-07-30
Payer: COMMERCIAL

## 2021-07-30 VITALS
SYSTOLIC BLOOD PRESSURE: 160 MMHG | WEIGHT: 285 LBS | TEMPERATURE: 98 F | HEART RATE: 51 BPM | DIASTOLIC BLOOD PRESSURE: 92 MMHG | HEIGHT: 67 IN | BODY MASS INDEX: 44.73 KG/M2 | RESPIRATION RATE: 18 BRPM | OXYGEN SATURATION: 100 %

## 2021-07-30 DIAGNOSIS — Z12.11 SPECIAL SCREENING FOR MALIGNANT NEOPLASM OF COLON: ICD-10-CM

## 2021-07-30 PROCEDURE — 0DJD8ZZ INSPECTION OF LOWER INTESTINAL TRACT, VIA NATURAL OR ARTIFICIAL OPENING ENDOSCOPIC: ICD-10-PCS | Performed by: INTERNAL MEDICINE

## 2021-07-30 RX ORDER — SODIUM CHLORIDE, SODIUM LACTATE, POTASSIUM CHLORIDE, CALCIUM CHLORIDE 600; 310; 30; 20 MG/100ML; MG/100ML; MG/100ML; MG/100ML
INJECTION, SOLUTION INTRAVENOUS CONTINUOUS
Status: DISCONTINUED | OUTPATIENT
Start: 2021-07-30 | End: 2021-07-30

## 2021-07-30 RX ORDER — NALOXONE HYDROCHLORIDE 0.4 MG/ML
80 INJECTION, SOLUTION INTRAMUSCULAR; INTRAVENOUS; SUBCUTANEOUS AS NEEDED
Status: DISCONTINUED | OUTPATIENT
Start: 2021-07-30 | End: 2021-07-30

## 2021-07-30 RX ORDER — LIDOCAINE HYDROCHLORIDE 10 MG/ML
INJECTION, SOLUTION EPIDURAL; INFILTRATION; INTRACAUDAL; PERINEURAL AS NEEDED
Status: DISCONTINUED | OUTPATIENT
Start: 2021-07-30 | End: 2021-07-30 | Stop reason: SURG

## 2021-07-30 RX ADMIN — SODIUM CHLORIDE, SODIUM LACTATE, POTASSIUM CHLORIDE, CALCIUM CHLORIDE: 600; 310; 30; 20 INJECTION, SOLUTION INTRAVENOUS at 10:49:00

## 2021-07-30 RX ADMIN — LIDOCAINE HYDROCHLORIDE 25 MG: 10 INJECTION, SOLUTION EPIDURAL; INFILTRATION; INTRACAUDAL; PERINEURAL at 10:51:00

## 2021-07-30 NOTE — H&P
History & Physical Examination    Patient Name: Alexander Kang MRN: [id number]  CSN: [id number]  YOB: 1970    Diagnosis: colorectal cancer screen    Present Illness: 45 y/o M history as above presents for Colonoscopy.      PEG 3350-KCl-Na ]    UROGENITAL [ n/a] [ n/a]    EXTREMITIES [ x] [x ]    OTHER        [ x ] I have discussed the risks and benefits and alternatives with the patient/family. They understand and agree to proceed with plan of care.   [ x ] I have reviewed the History and P

## 2021-07-30 NOTE — ANESTHESIA POSTPROCEDURE EVALUATION
Monty We-07-A 1498.  Patient Status:  Hospital Outpatient Surgery   Age/Gender 46year old male MRN [id number]   Location [id number] [place] [address] Attending Miky Ochoa, [address] [facility] Day # 0 PCP MD Tana Izquierdo

## 2021-07-30 NOTE — ANESTHESIA PREPROCEDURE EVALUATION
PRE-OP EVALUATION    Patient Name: Vazquez Verdin    Admit Diagnosis: Special screening for malignant neoplasm of colon [Z12.11]    Pre-op Diagnosis: Special screening for malignant neoplasm of colon [Z12.11]    COLONOSCOPY    Anesthesia Procedure: Joselyn Grant dental history. Pulmonary    Pulmonary exam normal.  Breath sounds clear to auscultation bilaterally. Other findings            ASA: 3   Plan: MAC  NPO status verified and patient meets guidelines.   Patient has not taken beta blockers

## 2021-07-30 NOTE — OPERATIVE REPORT
[address] [facility].  Patient Status:  Hospital Outpatient Surgery    1970 MRN [id number]   Location [address] [place] [address] Attending Obey Man, DO   Hosp Day # 0 PCP Zulay Murrieta MD       PREOPERATIVE DIAGNOSIS/INDICA in the rectum. IMPRESSION:   1. Diverticulosis. 2. Internal hemorrhoids. RECOMMENDATIONS:   1. Repeat colonoscopy in 10 years.        Terese Caban  Gastroenterology/Advanced Endoscopy  [facility]

## 2021-08-23 ENCOUNTER — HOSPITAL ENCOUNTER (EMERGENCY)
Age: 51
Discharge: HOME | End: 2021-08-23
Attending: EMERGENCY MEDICINE
Payer: COMMERCIAL

## 2021-08-23 ENCOUNTER — APPOINTMENT (OUTPATIENT)
Dept: CT IMAGING | Age: 51
End: 2021-08-23
Attending: EMERGENCY MEDICINE
Payer: COMMERCIAL

## 2021-08-23 VITALS
SYSTOLIC BLOOD PRESSURE: 169 MMHG | HEIGHT: 67 IN | OXYGEN SATURATION: 99 % | BODY MASS INDEX: 44.73 KG/M2 | TEMPERATURE: 97 F | RESPIRATION RATE: 18 BRPM | DIASTOLIC BLOOD PRESSURE: 86 MMHG | HEART RATE: 50 BPM | WEIGHT: 285 LBS

## 2021-08-23 DIAGNOSIS — I10 HYPERTENSION, UNSPECIFIED TYPE: Primary | ICD-10-CM

## 2021-08-23 LAB
ALBUMIN SERPL-MCNC: 3.7 G/DL (ref 3.4–5)
ALBUMIN/GLOB SERPL: 0.9 {RATIO} (ref 1–2)
ALP LIVER SERPL-CCNC: 97 U/L
ALT SERPL-CCNC: 19 U/L
ANION GAP SERPL CALC-SCNC: 3 MMOL/L (ref 0–18)
AST SERPL-CCNC: 9 U/L (ref 15–37)
ATRIAL RATE: 52 BPM
BASOPHILS # BLD AUTO: 0.07 X10(3) UL (ref 0–0.2)
BASOPHILS NFR BLD AUTO: 0.8 %
BILIRUB SERPL-MCNC: 0.3 MG/DL (ref 0.1–2)
BUN BLD-MCNC: 18 MG/DL (ref 7–18)
CALCIUM BLD-MCNC: 8.7 MG/DL (ref 8.5–10.1)
CHLORIDE SERPL-SCNC: 109 MMOL/L (ref 98–112)
CO2 SERPL-SCNC: 27 MMOL/L (ref 21–32)
CREAT BLD-MCNC: 1.54 MG/DL
EOSINOPHIL # BLD AUTO: 0.3 X10(3) UL (ref 0–0.7)
EOSINOPHIL NFR BLD AUTO: 3.3 %
ERYTHROCYTE [DISTWIDTH] IN BLOOD BY AUTOMATED COUNT: 14.1 %
GLOBULIN PLAS-MCNC: 3.9 G/DL (ref 2.8–4.4)
GLUCOSE BLD-MCNC: 103 MG/DL (ref 70–99)
HCT VFR BLD AUTO: 44.2 %
HGB BLD-MCNC: 14.8 G/DL
IMM GRANULOCYTES # BLD AUTO: 0.05 X10(3) UL (ref 0–1)
IMM GRANULOCYTES NFR BLD: 0.5 %
LYMPHOCYTES # BLD AUTO: 2.1 X10(3) UL (ref 1–4)
LYMPHOCYTES NFR BLD AUTO: 23 %
M PROTEIN MFR SERPL ELPH: 7.6 G/DL (ref 6.4–8.2)
MCH RBC QN AUTO: 28.7 PG (ref 26–34)
MCHC RBC AUTO-ENTMCNC: 33.5 G/DL (ref 31–37)
MCV RBC AUTO: 85.8 FL
MONOCYTES # BLD AUTO: 0.76 X10(3) UL (ref 0.1–1)
MONOCYTES NFR BLD AUTO: 8.3 %
NEUTROPHILS # BLD AUTO: 5.84 X10 (3) UL (ref 1.5–7.7)
NEUTROPHILS # BLD AUTO: 5.84 X10(3) UL (ref 1.5–7.7)
NEUTROPHILS NFR BLD AUTO: 64.1 %
OSMOLALITY SERPL CALC.SUM OF ELEC: 290 MOSM/KG (ref 275–295)
P AXIS: 50 DEGREES
P-R INTERVAL: 206 MS
PLATELET # BLD AUTO: 316 10(3)UL (ref 150–450)
POTASSIUM SERPL-SCNC: 3.8 MMOL/L (ref 3.5–5.1)
Q-T INTERVAL: 440 MS
QRS DURATION: 90 MS
QTC CALCULATION (BEZET): 409 MS
R AXIS: 43 DEGREES
RBC # BLD AUTO: 5.15 X10(6)UL
SODIUM SERPL-SCNC: 139 MMOL/L (ref 136–145)
T AXIS: 171 DEGREES
TROPONIN I SERPL-MCNC: <0.045 NG/ML (ref ?–0.04)
VENTRICULAR RATE: 52 BPM
WBC # BLD AUTO: 9.1 X10(3) UL (ref 4–11)

## 2021-08-23 PROCEDURE — 99284 EMERGENCY DEPT VISIT MOD MDM: CPT

## 2021-08-23 PROCEDURE — 70450 CT HEAD/BRAIN W/O DYE: CPT | Performed by: EMERGENCY MEDICINE

## 2021-08-23 PROCEDURE — 93010 ELECTROCARDIOGRAM REPORT: CPT

## 2021-08-23 PROCEDURE — 80053 COMPREHEN METABOLIC PANEL: CPT | Performed by: EMERGENCY MEDICINE

## 2021-08-23 PROCEDURE — 84484 ASSAY OF TROPONIN QUANT: CPT | Performed by: EMERGENCY MEDICINE

## 2021-08-23 PROCEDURE — 93005 ELECTROCARDIOGRAM TRACING: CPT

## 2021-08-23 PROCEDURE — 36415 COLL VENOUS BLD VENIPUNCTURE: CPT

## 2021-08-23 PROCEDURE — 85025 COMPLETE CBC W/AUTO DIFF WBC: CPT | Performed by: EMERGENCY MEDICINE

## 2021-08-23 RX ORDER — LOSARTAN POTASSIUM 25 MG/1
25 TABLET ORAL DAILY
Qty: 30 TABLET | Refills: 0 | Status: SHIPPED | OUTPATIENT
Start: 2021-08-23 | End: 2021-09-20 | Stop reason: ALTCHOICE

## 2021-08-23 NOTE — ED PROVIDER NOTES
Patient Seen in: THE [facility] Emergency Department In [place]      History   Patient presents with:  Hypertension    Stated Complaint: pt reports high bp at home; sent by PCP    HPI/Subjective:   HPI    The patient is a 63-year-old male history of hypertens Review of Systems    Positive for stated complaint: pt reports high bp at home; sent by PCP  Other systems are as noted in HPI. Constitutional and vital signs reviewed. All other systems reviewed and negative except as noted above.     Physical components:       Result Value    Glucose 103 (*)     Creatinine 1.54 (*)     GFR, Non- 51 (*)     AST 9 (*)     A/G Ratio 0.9 (*)     All other components within normal limits   TROPONIN I - Normal   CBC WITH DIFFERENTIAL WITH PLATELET [employer]) NRDR ([address])     which includes the Dose Index     Registry. PATIENT STATED HISTORY: (As transcribed by Technologist)  Patient complain     of elevated blood pressure with headache for (1)day. (primary encounter diagnosis)     Disposition:  Discharge  8/23/2021  6:40 pm    Follow-up:  Sylvia Suh MD  [facility] [place] [facility] [address] [place] [address]  [phone]    Call in 2 days            Medications Prescribed:  Discharge Medication List as of

## 2021-08-23 NOTE — ED INITIAL ASSESSMENT (HPI)
High bp since sat. Denies any changes in bp meds. C/o mild ha. Denies cp or sob. bp at home over 459 sytolic over 90 dialstolic.

## 2021-08-24 ENCOUNTER — TELEPHONE (OUTPATIENT)
Dept: FAMILY MEDICINE CLINIC | Facility: CLINIC | Age: 51
End: 2021-08-24

## 2021-08-24 NOTE — TELEPHONE ENCOUNTER
[facility]  Patient Appointment Schedule Request Pool Yesterday (8:48 AM)   Rajeev Michaud had asked that I notify him if my blood pressure changed as he was considering a change to my presription.   It seems that my blood pressure has gone up ove

## 2021-09-01 ENCOUNTER — OFFICE VISIT (OUTPATIENT)
Dept: FAMILY MEDICINE CLINIC | Facility: CLINIC | Age: 51
End: 2021-09-01
Payer: COMMERCIAL

## 2021-09-01 VITALS
DIASTOLIC BLOOD PRESSURE: 100 MMHG | SYSTOLIC BLOOD PRESSURE: 186 MMHG | WEIGHT: 291 LBS | RESPIRATION RATE: 16 BRPM | BODY MASS INDEX: 45.67 KG/M2 | HEIGHT: 67 IN | OXYGEN SATURATION: 98 % | HEART RATE: 67 BPM

## 2021-09-01 DIAGNOSIS — Z23 NEED FOR SHINGLES VACCINE: ICD-10-CM

## 2021-09-01 DIAGNOSIS — I10 ESSENTIAL HYPERTENSION: Primary | ICD-10-CM

## 2021-09-01 PROCEDURE — 99213 OFFICE O/P EST LOW 20 MIN: CPT | Performed by: FAMILY MEDICINE

## 2021-09-01 PROCEDURE — 3077F SYST BP >= 140 MM HG: CPT | Performed by: FAMILY MEDICINE

## 2021-09-01 PROCEDURE — 3008F BODY MASS INDEX DOCD: CPT | Performed by: FAMILY MEDICINE

## 2021-09-01 PROCEDURE — 90750 HZV VACC RECOMBINANT IM: CPT | Performed by: FAMILY MEDICINE

## 2021-09-01 PROCEDURE — 3080F DIAST BP >= 90 MM HG: CPT | Performed by: FAMILY MEDICINE

## 2021-09-01 PROCEDURE — 90471 IMMUNIZATION ADMIN: CPT | Performed by: FAMILY MEDICINE

## 2021-09-01 NOTE — PROGRESS NOTES
[facility] Progress Note    SUBJECTIVE: Serge Nieves. 46year old male is here today for Patient presents with:  Hypertension      Had been having mild headaches for a week or two, and reminded him of what he had before being put on antihyp Tab Take 1 tablet (81 mg total) by mouth daily. 365 tablet 0   • omega-3 fatty acids 1000 MG Oral Cap Take 1,000 mg by mouth daily. 90 capsule 3         Assessment/Plan  Austyn Mckeon was seen today for hypertension.     Diagnoses and all orders for this visit:

## 2021-09-09 ENCOUNTER — TELEPHONE (OUTPATIENT)
Dept: FAMILY MEDICINE CLINIC | Facility: CLINIC | Age: 51
End: 2021-09-09

## 2021-09-09 NOTE — TELEPHONE ENCOUNTER
Pt was told to come in for a 1 wk bp check but he does not know if Dr. Rashawn Couch wants to see him again or is it a nurse visit?

## 2021-09-10 NOTE — TELEPHONE ENCOUNTER
Can be nurse visit, I just need the info at this time.  Goal was also to recheck BMP at the time    Sandip Blount MD

## 2021-09-13 ENCOUNTER — NURSE ONLY (OUTPATIENT)
Dept: FAMILY MEDICINE CLINIC | Facility: CLINIC | Age: 51
End: 2021-09-13
Payer: COMMERCIAL

## 2021-09-13 ENCOUNTER — LAB ENCOUNTER (OUTPATIENT)
Dept: LAB | Age: 51
End: 2021-09-13
Attending: FAMILY MEDICINE
Payer: COMMERCIAL

## 2021-09-13 VITALS — SYSTOLIC BLOOD PRESSURE: 162 MMHG | DIASTOLIC BLOOD PRESSURE: 90 MMHG

## 2021-09-13 DIAGNOSIS — I10 ESSENTIAL HYPERTENSION: ICD-10-CM

## 2021-09-13 LAB
ANION GAP SERPL CALC-SCNC: 7 MMOL/L (ref 0–18)
BUN BLD-MCNC: 17 MG/DL (ref 7–18)
CALCIUM BLD-MCNC: 9.4 MG/DL (ref 8.5–10.1)
CHLORIDE SERPL-SCNC: 109 MMOL/L (ref 98–112)
CO2 SERPL-SCNC: 27 MMOL/L (ref 21–32)
CREAT BLD-MCNC: 1.53 MG/DL
GLUCOSE BLD-MCNC: 107 MG/DL (ref 70–99)
OSMOLALITY SERPL CALC.SUM OF ELEC: 298 MOSM/KG (ref 275–295)
PATIENT FASTING Y/N/NP: YES
POTASSIUM SERPL-SCNC: 3.8 MMOL/L (ref 3.5–5.1)
SODIUM SERPL-SCNC: 143 MMOL/L (ref 136–145)

## 2021-09-13 PROCEDURE — 3080F DIAST BP >= 90 MM HG: CPT | Performed by: FAMILY MEDICINE

## 2021-09-13 PROCEDURE — 80048 BASIC METABOLIC PNL TOTAL CA: CPT | Performed by: FAMILY MEDICINE

## 2021-09-13 PROCEDURE — 3077F SYST BP >= 140 MM HG: CPT | Performed by: FAMILY MEDICINE

## 2021-09-13 NOTE — PROGRESS NOTES
He saw Dr. Leo Palafox for hypertension follow-up July 1. If he is not available to answer, would consider increasing the losartan to 50 mg. Follow-up in about 10 to 14 days with blood pressure readings.

## 2021-09-20 ENCOUNTER — NURSE ONLY (OUTPATIENT)
Dept: FAMILY MEDICINE CLINIC | Facility: CLINIC | Age: 51
End: 2021-09-20
Payer: COMMERCIAL

## 2021-09-20 VITALS — SYSTOLIC BLOOD PRESSURE: 158 MMHG | DIASTOLIC BLOOD PRESSURE: 92 MMHG

## 2021-09-20 PROCEDURE — 3080F DIAST BP >= 90 MM HG: CPT | Performed by: FAMILY MEDICINE

## 2021-09-20 PROCEDURE — 3077F SYST BP >= 140 MM HG: CPT | Performed by: FAMILY MEDICINE

## 2021-09-21 NOTE — PROGRESS NOTES
If blood pressure continues to run above 140/90, increase amlodipine to 10 mg daily. Follow-up after 1 week.

## 2021-09-27 ENCOUNTER — NURSE ONLY (OUTPATIENT)
Dept: FAMILY MEDICINE CLINIC | Facility: CLINIC | Age: 51
End: 2021-09-27
Payer: COMMERCIAL

## 2021-09-27 VITALS — SYSTOLIC BLOOD PRESSURE: 138 MMHG | DIASTOLIC BLOOD PRESSURE: 86 MMHG

## 2021-09-27 PROCEDURE — 3079F DIAST BP 80-89 MM HG: CPT | Performed by: FAMILY MEDICINE

## 2021-09-27 PROCEDURE — 3075F SYST BP GE 130 - 139MM HG: CPT | Performed by: FAMILY MEDICINE

## 2021-09-27 RX ORDER — AMLODIPINE BESYLATE 10 MG/1
10 TABLET ORAL DAILY
Qty: 90 TABLET | Refills: 0 | Status: SHIPPED | OUTPATIENT
Start: 2021-09-27 | End: 2022-01-06

## 2021-10-05 RX ORDER — AMLODIPINE BESYLATE 5 MG/1
TABLET ORAL
Qty: 30 TABLET | Refills: 0 | OUTPATIENT
Start: 2021-10-05

## 2021-12-06 DIAGNOSIS — I10 ESSENTIAL HYPERTENSION: ICD-10-CM

## 2021-12-06 RX ORDER — METOPROLOL SUCCINATE 100 MG/1
TABLET, EXTENDED RELEASE ORAL
Qty: 90 TABLET | Refills: 1 | Status: SHIPPED | OUTPATIENT
Start: 2021-12-06

## 2022-01-06 RX ORDER — AMLODIPINE BESYLATE 10 MG/1
TABLET ORAL
Qty: 90 TABLET | Refills: 0 | Status: SHIPPED | OUTPATIENT
Start: 2022-01-06

## 2022-03-30 RX ORDER — AMLODIPINE BESYLATE 10 MG/1
TABLET ORAL
Qty: 30 TABLET | Refills: 0 | Status: SHIPPED | OUTPATIENT
Start: 2022-03-30

## 2022-04-28 RX ORDER — AMLODIPINE BESYLATE 10 MG/1
TABLET ORAL
Qty: 30 TABLET | Refills: 0 | Status: SHIPPED | OUTPATIENT
Start: 2022-04-28

## 2022-05-23 RX ORDER — AMLODIPINE BESYLATE 10 MG/1
TABLET ORAL
Qty: 30 TABLET | Refills: 0 | Status: SHIPPED | OUTPATIENT
Start: 2022-05-23

## 2022-05-25 ENCOUNTER — ORDER TRANSCRIPTION (OUTPATIENT)
Dept: ADMINISTRATIVE | Facility: HOSPITAL | Age: 52
End: 2022-05-25

## 2022-05-25 DIAGNOSIS — Z13.6 SCREENING FOR CARDIOVASCULAR CONDITION: Primary | ICD-10-CM

## 2022-05-27 ENCOUNTER — HOSPITAL ENCOUNTER (OUTPATIENT)
Dept: CT IMAGING | Facility: HOSPITAL | Age: 52
Discharge: HOME | End: 2022-05-27
Attending: FAMILY MEDICINE

## 2022-05-27 DIAGNOSIS — Z13.6 SCREENING FOR CARDIOVASCULAR CONDITION: ICD-10-CM

## 2022-06-10 DIAGNOSIS — I10 ESSENTIAL HYPERTENSION: ICD-10-CM

## 2022-06-10 RX ORDER — METOPROLOL SUCCINATE 100 MG/1
TABLET, EXTENDED RELEASE ORAL
Qty: 90 TABLET | Refills: 1 | Status: SHIPPED | OUTPATIENT
Start: 2022-06-10

## 2022-06-20 RX ORDER — AMLODIPINE BESYLATE 10 MG/1
10 TABLET ORAL DAILY
Qty: 30 TABLET | Refills: 3 | Status: SHIPPED | OUTPATIENT
Start: 2022-06-20 | End: 2022-10-03

## 2022-10-03 RX ORDER — AMLODIPINE BESYLATE 10 MG/1
TABLET ORAL
Qty: 90 TABLET | Refills: 1 | Status: SHIPPED | OUTPATIENT
Start: 2022-10-03

## 2022-12-18 DIAGNOSIS — I10 ESSENTIAL HYPERTENSION: ICD-10-CM

## 2022-12-19 RX ORDER — METOPROLOL SUCCINATE 100 MG/1
TABLET, EXTENDED RELEASE ORAL
Qty: 90 TABLET | Refills: 1 | OUTPATIENT
Start: 2022-12-19

## 2022-12-28 ENCOUNTER — TELEMEDICINE (OUTPATIENT)
Dept: FAMILY MEDICINE CLINIC | Facility: CLINIC | Age: 52
End: 2022-12-28
Payer: COMMERCIAL

## 2022-12-28 DIAGNOSIS — Z12.5 SCREENING FOR PROSTATE CANCER: ICD-10-CM

## 2022-12-28 DIAGNOSIS — R73.01 IMPAIRED FASTING GLUCOSE: ICD-10-CM

## 2022-12-28 DIAGNOSIS — I10 ESSENTIAL HYPERTENSION: ICD-10-CM

## 2022-12-28 DIAGNOSIS — I10 PRIMARY HYPERTENSION: Primary | ICD-10-CM

## 2022-12-28 DIAGNOSIS — E78.1 HYPERTRIGLYCERIDEMIA: ICD-10-CM

## 2022-12-28 PROCEDURE — 99213 OFFICE O/P EST LOW 20 MIN: CPT | Performed by: FAMILY MEDICINE

## 2022-12-28 RX ORDER — METOPROLOL SUCCINATE 100 MG/1
100 TABLET, EXTENDED RELEASE ORAL DAILY
Qty: 90 TABLET | Refills: 3 | Status: SHIPPED | OUTPATIENT
Start: 2022-12-28

## 2022-12-28 RX ORDER — AMLODIPINE BESYLATE 10 MG/1
10 TABLET ORAL DAILY
Qty: 90 TABLET | Refills: 3 | Status: SHIPPED | OUTPATIENT
Start: 2022-12-28

## 2022-12-30 ENCOUNTER — LAB ENCOUNTER (OUTPATIENT)
Dept: LAB | Age: 52
End: 2022-12-30
Attending: FAMILY MEDICINE
Payer: COMMERCIAL

## 2022-12-30 DIAGNOSIS — Z12.5 SCREENING FOR PROSTATE CANCER: ICD-10-CM

## 2022-12-30 DIAGNOSIS — E78.1 HYPERTRIGLYCERIDEMIA: ICD-10-CM

## 2022-12-30 DIAGNOSIS — E78.2 MIXED HYPERLIPIDEMIA: Primary | ICD-10-CM

## 2022-12-30 DIAGNOSIS — R73.01 IMPAIRED FASTING GLUCOSE: ICD-10-CM

## 2022-12-30 DIAGNOSIS — I10 PRIMARY HYPERTENSION: ICD-10-CM

## 2022-12-30 LAB
ALBUMIN SERPL-MCNC: 3.4 G/DL (ref 3.4–5)
ALBUMIN/GLOB SERPL: 0.9 {RATIO} (ref 1–2)
ALP LIVER SERPL-CCNC: 105 U/L
ALT SERPL-CCNC: 23 U/L
ANION GAP SERPL CALC-SCNC: 4 MMOL/L (ref 0–18)
AST SERPL-CCNC: 12 U/L (ref 15–37)
BILIRUB SERPL-MCNC: 0.4 MG/DL (ref 0.1–2)
BUN BLD-MCNC: 20 MG/DL (ref 7–18)
CALCIUM BLD-MCNC: 9.2 MG/DL (ref 8.5–10.1)
CHLORIDE SERPL-SCNC: 111 MMOL/L (ref 98–112)
CHOLEST SERPL-MCNC: 252 MG/DL (ref ?–200)
CO2 SERPL-SCNC: 24 MMOL/L (ref 21–32)
COMPLEXED PSA SERPL-MCNC: 2.79 NG/ML (ref ?–4)
CREAT BLD-MCNC: 1.82 MG/DL
EST. AVERAGE GLUCOSE BLD GHB EST-MCNC: 120 MG/DL (ref 68–126)
FASTING PATIENT LIPID ANSWER: YES
FASTING STATUS PATIENT QL REPORTED: YES
GFR SERPLBLD BASED ON 1.73 SQ M-ARVRAT: 44 ML/MIN/1.73M2 (ref 60–?)
GLOBULIN PLAS-MCNC: 3.8 G/DL (ref 2.8–4.4)
GLUCOSE BLD-MCNC: 120 MG/DL (ref 70–99)
HBA1C MFR BLD: 5.8 % (ref ?–5.7)
HDLC SERPL-MCNC: 34 MG/DL (ref 40–59)
LDLC SERPL CALC-MCNC: 187 MG/DL (ref ?–100)
NONHDLC SERPL-MCNC: 218 MG/DL (ref ?–130)
OSMOLALITY SERPL CALC.SUM OF ELEC: 292 MOSM/KG (ref 275–295)
POTASSIUM SERPL-SCNC: 3.9 MMOL/L (ref 3.5–5.1)
PROT SERPL-MCNC: 7.2 G/DL (ref 6.4–8.2)
SODIUM SERPL-SCNC: 139 MMOL/L (ref 136–145)
TRIGL SERPL-MCNC: 166 MG/DL (ref 30–149)
VLDLC SERPL CALC-MCNC: 35 MG/DL (ref 0–30)

## 2022-12-30 PROCEDURE — 80061 LIPID PANEL: CPT | Performed by: FAMILY MEDICINE

## 2022-12-30 PROCEDURE — 80053 COMPREHEN METABOLIC PANEL: CPT | Performed by: FAMILY MEDICINE

## 2022-12-30 PROCEDURE — 84153 ASSAY OF PSA TOTAL: CPT | Performed by: FAMILY MEDICINE

## 2022-12-30 PROCEDURE — 83036 HEMOGLOBIN GLYCOSYLATED A1C: CPT | Performed by: FAMILY MEDICINE

## 2022-12-30 RX ORDER — ROSUVASTATIN CALCIUM 20 MG/1
20 TABLET, COATED ORAL NIGHTLY
Qty: 30 TABLET | Refills: 1 | Status: SHIPPED | OUTPATIENT
Start: 2022-12-30

## 2023-01-26 DIAGNOSIS — E78.2 MIXED HYPERLIPIDEMIA: ICD-10-CM

## 2023-01-26 RX ORDER — ROSUVASTATIN CALCIUM 20 MG/1
20 TABLET, COATED ORAL NIGHTLY
Qty: 30 TABLET | Refills: 1 | Status: SHIPPED | OUTPATIENT
Start: 2023-01-26

## 2023-03-02 DIAGNOSIS — E78.2 MIXED HYPERLIPIDEMIA: ICD-10-CM

## 2023-03-02 RX ORDER — ROSUVASTATIN CALCIUM 20 MG/1
TABLET, COATED ORAL
Qty: 30 TABLET | Refills: 1 | Status: SHIPPED | OUTPATIENT
Start: 2023-03-02

## 2023-03-26 DIAGNOSIS — E78.2 MIXED HYPERLIPIDEMIA: ICD-10-CM

## 2023-03-27 RX ORDER — ROSUVASTATIN CALCIUM 20 MG/1
TABLET, COATED ORAL
Qty: 30 TABLET | Refills: 1 | OUTPATIENT
Start: 2023-03-27

## 2023-07-27 ENCOUNTER — LAB ENCOUNTER (OUTPATIENT)
Dept: LAB | Age: 53
End: 2023-07-27
Attending: FAMILY MEDICINE
Payer: COMMERCIAL

## 2023-07-27 DIAGNOSIS — E78.2 MIXED HYPERLIPIDEMIA: ICD-10-CM

## 2023-07-27 LAB
CHOLEST SERPL-MCNC: 224 MG/DL (ref ?–200)
FASTING PATIENT LIPID ANSWER: YES
HDLC SERPL-MCNC: 35 MG/DL (ref 40–59)
LDLC SERPL CALC-MCNC: 161 MG/DL (ref ?–100)
NONHDLC SERPL-MCNC: 189 MG/DL (ref ?–130)
TRIGL SERPL-MCNC: 154 MG/DL (ref 30–149)
VLDLC SERPL CALC-MCNC: 30 MG/DL (ref 0–30)

## 2023-07-27 PROCEDURE — 36415 COLL VENOUS BLD VENIPUNCTURE: CPT

## 2023-07-27 PROCEDURE — 80061 LIPID PANEL: CPT

## 2023-09-07 ENCOUNTER — OFFICE VISIT (OUTPATIENT)
Dept: FAMILY MEDICINE CLINIC | Facility: CLINIC | Age: 53
End: 2023-09-07
Payer: COMMERCIAL

## 2023-09-07 VITALS
HEART RATE: 64 BPM | SYSTOLIC BLOOD PRESSURE: 138 MMHG | RESPIRATION RATE: 15 BRPM | WEIGHT: 293 LBS | HEIGHT: 67 IN | OXYGEN SATURATION: 96 % | BODY MASS INDEX: 45.99 KG/M2 | DIASTOLIC BLOOD PRESSURE: 78 MMHG

## 2023-09-07 DIAGNOSIS — Z00.00 ROUTINE GENERAL MEDICAL EXAMINATION AT A HEALTH CARE FACILITY: Primary | ICD-10-CM

## 2023-09-07 DIAGNOSIS — I10 PRIMARY HYPERTENSION: ICD-10-CM

## 2023-09-07 DIAGNOSIS — R73.01 IMPAIRED FASTING GLUCOSE: ICD-10-CM

## 2023-09-07 DIAGNOSIS — E78.2 MIXED HYPERLIPIDEMIA: ICD-10-CM

## 2023-09-07 PROCEDURE — 99396 PREV VISIT EST AGE 40-64: CPT | Performed by: FAMILY MEDICINE

## 2023-09-07 PROCEDURE — 3078F DIAST BP <80 MM HG: CPT | Performed by: FAMILY MEDICINE

## 2023-09-07 PROCEDURE — 3008F BODY MASS INDEX DOCD: CPT | Performed by: FAMILY MEDICINE

## 2023-09-07 PROCEDURE — 3075F SYST BP GE 130 - 139MM HG: CPT | Performed by: FAMILY MEDICINE

## 2023-09-16 ENCOUNTER — LAB ENCOUNTER (OUTPATIENT)
Dept: LAB | Age: 53
End: 2023-09-16
Attending: FAMILY MEDICINE
Payer: COMMERCIAL

## 2023-09-16 DIAGNOSIS — E78.2 MIXED HYPERLIPIDEMIA: ICD-10-CM

## 2023-09-16 DIAGNOSIS — Z00.00 ROUTINE GENERAL MEDICAL EXAMINATION AT A HEALTH CARE FACILITY: ICD-10-CM

## 2023-09-16 DIAGNOSIS — R73.01 IMPAIRED FASTING GLUCOSE: ICD-10-CM

## 2023-09-16 DIAGNOSIS — I10 PRIMARY HYPERTENSION: ICD-10-CM

## 2023-09-16 LAB
ALBUMIN SERPL-MCNC: 3.4 G/DL (ref 3.4–5)
ALBUMIN/GLOB SERPL: 0.9 {RATIO} (ref 1–2)
ALP LIVER SERPL-CCNC: 95 U/L
ALT SERPL-CCNC: 17 U/L
ANION GAP SERPL CALC-SCNC: 4 MMOL/L (ref 0–18)
AST SERPL-CCNC: 12 U/L (ref 15–37)
BILIRUB SERPL-MCNC: 0.6 MG/DL (ref 0.1–2)
BUN BLD-MCNC: 18 MG/DL (ref 7–18)
CALCIUM BLD-MCNC: 9 MG/DL (ref 8.5–10.1)
CHLORIDE SERPL-SCNC: 111 MMOL/L (ref 98–112)
CHOLEST SERPL-MCNC: 210 MG/DL (ref ?–200)
CO2 SERPL-SCNC: 25 MMOL/L (ref 21–32)
CREAT BLD-MCNC: 1.71 MG/DL
EGFRCR SERPLBLD CKD-EPI 2021: 47 ML/MIN/1.73M2 (ref 60–?)
EST. AVERAGE GLUCOSE BLD GHB EST-MCNC: 123 MG/DL (ref 68–126)
FASTING PATIENT LIPID ANSWER: YES
FASTING STATUS PATIENT QL REPORTED: YES
GLOBULIN PLAS-MCNC: 3.9 G/DL (ref 2.8–4.4)
GLUCOSE BLD-MCNC: 112 MG/DL (ref 70–99)
HBA1C MFR BLD: 5.9 % (ref ?–5.7)
HDLC SERPL-MCNC: 33 MG/DL (ref 40–59)
LDLC SERPL CALC-MCNC: 153 MG/DL (ref ?–100)
NONHDLC SERPL-MCNC: 177 MG/DL (ref ?–130)
OSMOLALITY SERPL CALC.SUM OF ELEC: 293 MOSM/KG (ref 275–295)
POTASSIUM SERPL-SCNC: 4 MMOL/L (ref 3.5–5.1)
PROT SERPL-MCNC: 7.3 G/DL (ref 6.4–8.2)
SODIUM SERPL-SCNC: 140 MMOL/L (ref 136–145)
TRIGL SERPL-MCNC: 131 MG/DL (ref 30–149)
VLDLC SERPL CALC-MCNC: 25 MG/DL (ref 0–30)

## 2023-09-16 PROCEDURE — 80053 COMPREHEN METABOLIC PANEL: CPT | Performed by: FAMILY MEDICINE

## 2023-09-16 PROCEDURE — 83036 HEMOGLOBIN GLYCOSYLATED A1C: CPT | Performed by: FAMILY MEDICINE

## 2023-09-16 PROCEDURE — 80061 LIPID PANEL: CPT | Performed by: FAMILY MEDICINE

## 2023-11-10 NOTE — PATIENT INSTRUCTIONS
Fast 8 hours for labs. Water, black coffee, or plain tea only. Any sugar in the system will alter the glucose level and triglyceride level.     Get a few bp reading in 1- 2 weeks,  Goal less than 140/90 Voicemail received on 11/10/23.  Mom returning call.  Voicemail left on 11/9/23 at 3:44pm.  Best number to call is [phone] (home).

## 2024-01-20 DIAGNOSIS — I10 ESSENTIAL HYPERTENSION: ICD-10-CM

## 2024-01-22 RX ORDER — METOPROLOL SUCCINATE 100 MG/1
100 TABLET, EXTENDED RELEASE ORAL DAILY
Qty: 90 TABLET | Refills: 3 | Status: SHIPPED | OUTPATIENT
Start: 2024-01-22

## 2024-02-13 NOTE — PROGRESS NOTES
[facility] PULMONARY  SLEEP PROGRESS NOTE        HPI:   This is a 54 year old male coming in for   Chief Complaint   Patient presents with    New Patient    Obstructive Sleep Apnea (MAMTA)     Former [facility] pt, Dr. Valdez, LOV 10/8/2020       HPI:   This is a 54 year old male who presents with the following symptoms, risk factors, behaviors or other items associated with sleep problems.    Sleep Apnea:   snoring; gasping/choking; stops breathing; mouth breathing; restless sleep; non-refreshing sleep; overweight; high blood pressure; family member with sleep apnea; previous sleep study; current CPAP use  Insomnia:  non-refreshing sleep  Restless Leg:  no symptoms or restless legs  Parasomnias:   whole body jerks just before falling asleep  Daytime Problems:  sleepiness; previous sleep study    The patient's [place] Sleepiness score is 3/24.     Sleep hours about 7-8  Sleeps through  He clearly benefits from using cpap  He feels his pressure is too low      2019 split study IMPRESSION:    1.       The diagnostic portion of the study showed severe obstructive sleep apnea with an overall AHI of 105.8 and an SaO2 yaneth of 73%.  Loud to heroic snoring was noted during the study.  2.       The second portion of the study was a successful CPAP titration.  The effective pressure was 12, seen during supine REM sleep.  Oxygen saturation was improved on this part of the study.    Patient: Sleep review of systems today: see form.      Pt  PCP:  Сергей Monteiro MD  No referring provider defined for this encounter.      Wakes feeling that pressure is too low  Sleeps on his sides  No wt changes  Machine is not working properly         No data to display                    Past Medical History:   Diagnosis Date    Allergic rhinitis     Back problem     Lumbar     Diverticulitis 2015    required surgery    Essential hypertension     High blood pressure     Hyperlipidemia     Obesity     Sleep apnea     CPAP    Stargardt's sensory retina  degeneration 06/29/2012    Visual impairment     glasses     Past Surgical History:   Procedure Laterality Date    COLONOSCOPY      COLONOSCOPY N/A 07/30/2021    Procedure: COLONOSCOPY;  Surgeon: Isaías Peng DO;  Location:  ENDOSCOPY    LAP, SURG; COLECTOMY, PARTIAL, W/ANASTOMOSIS, W/COLOPROCTOSTOMY  2005    OTHER SURGICAL HISTORY  2002    colon resection for diverticulitis     Social History:  Social History     Social History Narrative    Not on file     Social History     Socioeconomic History    Marital status:    Tobacco Use    Smoking status: Never    Smokeless tobacco: Never   Vaping Use    Vaping Use: Never used   Substance and Sexual Activity    Alcohol use: Yes     Alcohol/week: 1.0 standard drink of alcohol     Types: 1 Cans of beer per week     Comment: rarely    Drug use: No   Other Topics Concern    Caffeine Concern Yes    Exercise No     Family History:  Family History   Problem Relation Age of Onset    Hypertension Father     Diabetes Paternal Grandmother     Heart Disorder Paternal Grandfather     Hearing loss Daughter     Hearing loss Son      Allergies:  Allergies   Allergen Reactions    Hydrochlorothiazide OTHER (SEE COMMENTS)     Gout flares every 3 weeks.    Lisinopril Coughing     Current Meds:  Current Outpatient Medications   Medication Sig Dispense Refill    metoprolol succinate  MG Oral Tablet 24 Hr Take 1 tablet (100 mg total) by mouth daily. 90 tablet 3    amLODIPine 10 MG Oral Tab Take 1 tablet (10 mg total) by mouth daily. 90 tablet 3    aspirin 81 MG Oral Tab Take 1 tablet (81 mg total) by mouth daily. 365 tablet 0    omega-3 fatty acids 1000 MG Oral Cap Take 1,000 mg by mouth daily. 90 capsule 3      Counseling given: Not Answered         Problem List:  Patient Active Problem List   Diagnosis    Stargardt's sensory retina degeneration    Hypertension    Impaired fasting glucose    Hypertriglyceridemia    Seborrheic keratosis    Elevated serum creatinine     Obstructive sleep apnea    Arthritis of neck    Herniated nucleus pulposus, C6-7 left    Mixed hyperlipidemia    Class 3 severe obesity due to excess calories without serious comorbidity with body mass index (BMI) of 45.0 to 49.9 in adult (HCC)       REVIEW OF SYSTEMS:   Review of Systems    EXAM:   /70 (BP Location: Right arm, Patient Position: Sitting, Cuff Size: large)   Pulse 54   Resp 16   Ht 5' 7\" (1.702 m)   Wt 297 lb (134.7 kg)   SpO2 100%   BMI 46.52 kg/m²  Estimated body mass index is 46.52 kg/m² as calculated from the following:    Height as of this encounter: 5' 7\" (1.702 m).    Weight as of this encounter: 297 lb (134.7 kg).   Neck in inches:      Wt Readings from Last 6 Encounters:   02/14/24 297 lb (134.7 kg)   09/07/23 293 lb (132.9 kg)   09/01/21 291 lb (132 kg)   08/23/21 285 lb (129.3 kg)   07/30/21 285 lb (129.3 kg)   06/24/21 194 lb (88 kg)     BP Readings from Last 3 Encounters:   02/14/24 130/70   09/07/23 138/78   09/27/21 138/86     Pulse Readings from Last 3 Encounters:   02/14/24 54   09/07/23 64   09/01/21 67     SpO2 Readings from Last 3 Encounters:   02/14/24 100%   09/07/23 96%   09/01/21 98%      Ideal body weight: 66.1 kg (145 lb 11.6 oz)  Adjusted ideal body weight: 93.5 kg (206 lb 3.7 oz)    Vital signs reviewed.  Physical Exam    ASSESSMENT AND PLAN:   1. Obstructive sleep apnea  Increase pressure setting from 10-16 to 10-20cwp  DME is [facility]  Wanting new device has replacement recalled machine  Treatment options were discussed.  Positive airway pressure therapy is the gold standard.  Other options include mandibular advancement devices, evaluation of the upper airway by ear nose throat specialist, inspire therapy, and weight loss to be used in conjunction.   He will continue with PAP , looking to see if he can obtain new device  Followup 31-90 days    2. Class 3 severe obesity due to excess calories without serious comorbidity with body mass index (BMI) of 45.0 to 49.9  in adult (HCC)      There are no Patient Instructions on file for this visit.    Independent interpretation of Sleep Download as defined above.  Continue with Rx management of Sleep apnea with PAP therapy.    COMPLIANCE is required by insurance for 4 hours a night most nights of the week.    Advised if still with sleep apnea and not using CPAP has a 7 fold increase in risk of heart attack, stroke, abnormal heart rhythm  and death,  increased risk of driving accidents.     Advised to refrain from driving when sleepy.      Recommend weight loss, and maintain and optimal BMI with Exercise 30 minutes most days to target heart rate .     Advised patient to change filters,masks,hoses  and tubes and equiptment on a  regular schedule.    Filters and seals shall be changed every 1 month,  Hoses every 3 months,   CPAP mask and humidifier chamber changed every 6 month  with the durable medical equipment provider.         Meds & Refills for this Visit:  Requested Prescriptions      No prescriptions requested or ordered in this encounter       Outcome: Parent verbalizes understanding. Parent is notified to call with any questions, complications, allergies, or worsening or changing symptoms.  Parent is to call with any side effects or complications from the treatments as a result of today.     \" This note was created utilizing Dragon speech recognition software.  Please excuse any grammatical errors. Call my office if you have any questions regarding this note. \"     Tamia Riggs, DO  2/14/2024  11:20 AM

## 2024-02-14 ENCOUNTER — OFFICE VISIT (OUTPATIENT)
Facility: CLINIC | Age: 54
End: 2024-02-14
Payer: COMMERCIAL

## 2024-02-14 VITALS
BODY MASS INDEX: 46.62 KG/M2 | OXYGEN SATURATION: 100 % | HEART RATE: 54 BPM | RESPIRATION RATE: 16 BRPM | SYSTOLIC BLOOD PRESSURE: 130 MMHG | WEIGHT: 297 LBS | HEIGHT: 67 IN | DIASTOLIC BLOOD PRESSURE: 70 MMHG

## 2024-02-14 DIAGNOSIS — E66.01 CLASS 3 SEVERE OBESITY DUE TO EXCESS CALORIES WITHOUT SERIOUS COMORBIDITY WITH BODY MASS INDEX (BMI) OF 45.0 TO 49.9 IN ADULT (HCC): ICD-10-CM

## 2024-02-14 DIAGNOSIS — G47.33 OBSTRUCTIVE SLEEP APNEA: Primary | ICD-10-CM

## 2024-02-14 PROBLEM — E66.813 CLASS 3 SEVERE OBESITY DUE TO EXCESS CALORIES WITHOUT SERIOUS COMORBIDITY WITH BODY MASS INDEX (BMI) OF 45.0 TO 49.9 IN ADULT: Status: ACTIVE | Noted: 2024-02-14

## 2024-02-14 PROBLEM — E66.813 CLASS 3 SEVERE OBESITY DUE TO EXCESS CALORIES WITHOUT SERIOUS COMORBIDITY WITH BODY MASS INDEX (BMI) OF 45.0 TO 49.9 IN ADULT (HCC): Status: ACTIVE | Noted: 2024-02-14

## 2024-02-14 PROCEDURE — 99204 OFFICE O/P NEW MOD 45 MIN: CPT | Performed by: OTHER

## 2024-02-14 PROCEDURE — 3075F SYST BP GE 130 - 139MM HG: CPT | Performed by: OTHER

## 2024-02-14 PROCEDURE — 3078F DIAST BP <80 MM HG: CPT | Performed by: OTHER

## 2024-02-14 PROCEDURE — 3008F BODY MASS INDEX DOCD: CPT | Performed by: OTHER

## 2024-02-20 ENCOUNTER — TELEPHONE (OUTPATIENT)
Facility: CLINIC | Age: 54
End: 2024-02-20

## 2024-10-28 PROBLEM — C44.91: Status: ACTIVE | Noted: 2024-10-28

## 2024-11-18 ENCOUNTER — PATIENT MESSAGE (OUTPATIENT)
Facility: CLINIC | Age: 54
End: 2024-11-18

## 2025-01-11 ENCOUNTER — PATIENT MESSAGE (OUTPATIENT)
Facility: CLINIC | Age: 55
End: 2025-01-11

## 2025-02-03 DIAGNOSIS — I10 ESSENTIAL HYPERTENSION: ICD-10-CM

## 2025-02-03 RX ORDER — METOPROLOL SUCCINATE 100 MG/1
100 TABLET, EXTENDED RELEASE ORAL DAILY
Qty: 90 TABLET | Refills: 0 | Status: SHIPPED | OUTPATIENT
Start: 2025-02-03

## 2025-03-05 ENCOUNTER — OFFICE VISIT (OUTPATIENT)
Dept: FAMILY MEDICINE CLINIC | Facility: CLINIC | Age: 55
End: 2025-03-05
Payer: COMMERCIAL

## 2025-03-05 VITALS
DIASTOLIC BLOOD PRESSURE: 98 MMHG | WEIGHT: 295 LBS | RESPIRATION RATE: 16 BRPM | OXYGEN SATURATION: 98 % | HEART RATE: 59 BPM | SYSTOLIC BLOOD PRESSURE: 184 MMHG | HEIGHT: 67 IN | BODY MASS INDEX: 46.3 KG/M2

## 2025-03-05 DIAGNOSIS — Z13.220 LIPID SCREENING: ICD-10-CM

## 2025-03-05 DIAGNOSIS — R73.09 ELEVATED GLUCOSE: ICD-10-CM

## 2025-03-05 DIAGNOSIS — Z13.29 THYROID DISORDER SCREEN: ICD-10-CM

## 2025-03-05 DIAGNOSIS — Z12.5 PROSTATE CANCER SCREENING: ICD-10-CM

## 2025-03-05 DIAGNOSIS — Z13.0 SCREENING FOR DEFICIENCY ANEMIA: ICD-10-CM

## 2025-03-05 DIAGNOSIS — Z00.00 WELLNESS EXAMINATION: Primary | ICD-10-CM

## 2025-03-05 PROCEDURE — 3077F SYST BP >= 140 MM HG: CPT | Performed by: FAMILY MEDICINE

## 2025-03-05 PROCEDURE — 3008F BODY MASS INDEX DOCD: CPT | Performed by: FAMILY MEDICINE

## 2025-03-05 PROCEDURE — 3080F DIAST BP >= 90 MM HG: CPT | Performed by: FAMILY MEDICINE

## 2025-03-05 PROCEDURE — 99396 PREV VISIT EST AGE 40-64: CPT | Performed by: FAMILY MEDICINE

## 2025-03-05 NOTE — PROGRESS NOTES
HPI:   Chi Verdin Jr. is a 55 year old male who presents for an Annual Health Visit.     Cong has some concerns, Has a discomfort in his back, not sure if muscle, kidney, intestine or what. Doesn't seem intestinal or kidney to him, no issue with going to the bathroom, but persisting for a few weeks. 3-4 weeks.    Just on right side, feels if sitting back and getting up micaela feel it, if in th car and bouncing will feel it.    No issues with sleeping or laying down. Nothing too similar before, did have a kidney stone and doesn't seem like that.    Has generally felt pretty good, had some issues with his CPAP, has a new machine now, and doing better. Hasn't felt any symptoms of higher blood pressure.    Social:  , 2 of 4 kids still at home, adults one in college.  Desk job, , for [employer] .  No particular hobbies, travels when they can.        Allergies:     Allergies   Allergen Reactions    Hydrochlorothiazide OTHER (SEE COMMENTS)     Gout flares every 3 weeks.    Lisinopril Coughing       CURRENT MEDICATIONS   Current Outpatient Medications   Medication Sig Dispense Refill    metoprolol succinate  MG Oral Tablet 24 Hr Take 1 tablet (100 mg total) by mouth daily. 90 tablet 0    aspirin 81 MG Oral Tab Take 1 tablet (81 mg total) by mouth daily. 365 tablet 0    omega-3 fatty acids 1000 MG Oral Cap Take 1,000 mg by mouth daily. 90 capsule 3      HISTORICAL INFORMATION   Past Medical History:    Allergic rhinitis    Back problem    Lumbar     Diverticulitis    required surgery    Essential hypertension    High blood pressure    Hyperlipidemia    Obesity    Sleep apnea    CPAP    Stargardt's sensory retina degeneration    Visual impairment    glasses      Past Surgical History:   Procedure Laterality Date    Colonoscopy      Colonoscopy N/A 07/30/2021    Procedure: COLONOSCOPY;  Surgeon: Isaías Peng DO;  Location:  ENDOSCOPY    Lap, surg; colectomy, partial,  w/anastomosis, w/coloproctostomy  2005    Other surgical history  2002    colon resection for diverticulitis      Family History   Problem Relation Age of Onset    Hypertension Father     Diabetes Paternal Grandmother     Heart Disorder Paternal Grandfather     Hearing loss Daughter     Hearing loss Son       SOCIAL HISTORY   Social History     Socioeconomic History    Marital status:    Tobacco Use    Smoking status: Never    Smokeless tobacco: Never   Vaping Use    Vaping status: Never Used   Substance and Sexual Activity    Alcohol use: Yes     Alcohol/week: 1.0 standard drink of alcohol     Types: 1 Cans of beer per week     Comment: rarely    Drug use: No   Other Topics Concern    Caffeine Concern Yes    Exercise No     Social History     Social History Narrative    Not on file        REVIEW OF SYSTEMS:     Constitutional: negative  Eyes: negative  ENT: negative  Respiratory: negative  Cardiovascular: negative  Gastrointestinal: negative  Integument/Breast:  followed with derm, and had some lesions frozen and one removed  Genitourinary: negative  Heme/Lymph:  negative  Musculoskeletal:  see HPI  Neurological: negative  Psych: negative  Endocrine: negative  Allergic/Immune: negative    EXAM:   BP (!) 200/106   Pulse 59   Resp 16   Ht 5' 7\" (1.702 m)   Wt 295 lb (133.8 kg)   SpO2 98%   BMI 46.20 kg/m²    Wt Readings from Last 6 Encounters:   03/05/25 295 lb (133.8 kg)   02/14/24 297 lb (134.7 kg)   09/07/23 293 lb (132.9 kg)   09/01/21 291 lb (132 kg)   08/23/21 285 lb (129.3 kg)   07/30/21 285 lb (129.3 kg)     Body mass index is 46.2 kg/m².    General: alert, appears stated age, and cooperative  Head: Normocephalic, without obvious abnormality, atraumatic  Eyes: conjunctivae/corneas clear. PERRL, EOM's intact. Fundi benign.  Ears: normal TM's and external ear canals both ears  Nose: Nares normal. Septum midline. Mucosa normal. No drainage or sinus tenderness.  Throat: lips, mucosa, and tongue  normal; teeth and gums normal  Neck: no adenopathy, no carotid bruit, no JVD, supple, symmetrical, trachea midline, and thyroid not enlarged, symmetric, no tenderness/mass/nodules  Heart: S1, S2 normal, no murmur, click, rub or gallop, regular rate and rhythm  Lungs: clear to auscultation bilaterally  Chest wall: no tenderness  Abdomen: soft, non-tender; bowel sounds normal; no masses,  no organomegaly  : deferred  Back: symmetric, no curvature. ROM normal. No CVA tenderness.  Extremities: extremities normal, atraumatic, no cyanosis or edema  Pulses: 2+ and symmetric  Skin: Skin color, texture, turgor normal. No rashes or lesions  Lymph Nodes: Cervical, supraclavicular, and axillary nodes normal.  Neurologic: Grossly normal    ASSESSMENT AND PLAN:   Cong was seen today for physical.    Diagnoses and all orders for this visit:    Wellness examination  -     Comp Metabolic Panel (14); Future  -     CBC With Differential With Platelet; Future  -     Hemoglobin A1C; Future  -     Lipid Panel; Future  -     TSH W Reflex To Free T4; Future    Elevated glucose  -     Hemoglobin A1C; Future    Lipid screening  -     Lipid Panel; Future    Prostate cancer screening  -     PSA Screen; Future    Screening for deficiency anemia  -     CBC With Differential With Platelet; Future    Thyroid disorder screen  -     TSH W Reflex To Free T4; Future    Plan labs, and recheck BP if ongoign elevation consider increase in antihypertensives, consider GLP 1 if labs stable.  There are no Patient Instructions on file for this visit.    The patient indicates understanding of these issues and agrees to the plan.    Problem List:  Patient Active Problem List   Diagnosis    Stargardt's sensory retina degeneration    Hypertension    Impaired fasting glucose    Hypertriglyceridemia    Seborrheic keratosis    Elevated serum creatinine    Obstructive sleep apnea    Arthritis of neck    Herniated nucleus pulposus, C6-7 left    Mixed hyperlipidemia     Class 3 severe obesity due to excess calories without serious comorbidity with body mass index (BMI) of 45.0 to 49.9 in adult (HCC)    Primary superficial basal cell carcinoma (BCC)       Keon Olivares MD  3/5/2025  4:02 PM

## 2025-03-08 ENCOUNTER — LAB ENCOUNTER (OUTPATIENT)
Dept: LAB | Age: 55
End: 2025-03-08
Attending: FAMILY MEDICINE
Payer: COMMERCIAL

## 2025-03-08 DIAGNOSIS — Z00.00 WELLNESS EXAMINATION: ICD-10-CM

## 2025-03-08 DIAGNOSIS — Z13.29 THYROID DISORDER SCREEN: ICD-10-CM

## 2025-03-08 DIAGNOSIS — Z12.5 PROSTATE CANCER SCREENING: ICD-10-CM

## 2025-03-08 DIAGNOSIS — R73.09 ELEVATED GLUCOSE: ICD-10-CM

## 2025-03-08 DIAGNOSIS — Z13.0 SCREENING FOR DEFICIENCY ANEMIA: ICD-10-CM

## 2025-03-08 DIAGNOSIS — Z13.220 LIPID SCREENING: ICD-10-CM

## 2025-03-08 LAB
ALBUMIN SERPL-MCNC: 4.5 G/DL (ref 3.2–4.8)
ALBUMIN/GLOB SERPL: 1.6 {RATIO} (ref 1–2)
ALP LIVER SERPL-CCNC: 77 U/L
ALT SERPL-CCNC: 9 U/L
ANION GAP SERPL CALC-SCNC: 9 MMOL/L (ref 0–18)
AST SERPL-CCNC: 10 U/L (ref ?–34)
BASOPHILS # BLD AUTO: 0.08 X10(3) UL (ref 0–0.2)
BASOPHILS NFR BLD AUTO: 1 %
BILIRUB SERPL-MCNC: 0.6 MG/DL (ref 0.3–1.2)
BUN BLD-MCNC: 21 MG/DL (ref 9–23)
CALCIUM BLD-MCNC: 9.6 MG/DL (ref 8.7–10.6)
CHLORIDE SERPL-SCNC: 108 MMOL/L (ref 98–112)
CHOLEST SERPL-MCNC: 222 MG/DL (ref ?–200)
CO2 SERPL-SCNC: 28 MMOL/L (ref 21–32)
COMPLEXED PSA SERPL-MCNC: 2.22 NG/ML (ref ?–4)
CREAT BLD-MCNC: 1.65 MG/DL
EGFRCR SERPLBLD CKD-EPI 2021: 49 ML/MIN/1.73M2 (ref 60–?)
EOSINOPHIL # BLD AUTO: 0.36 X10(3) UL (ref 0–0.7)
EOSINOPHIL NFR BLD AUTO: 4.4 %
ERYTHROCYTE [DISTWIDTH] IN BLOOD BY AUTOMATED COUNT: 14.4 %
EST. AVERAGE GLUCOSE BLD GHB EST-MCNC: 120 MG/DL (ref 68–126)
FASTING PATIENT LIPID ANSWER: YES
FASTING STATUS PATIENT QL REPORTED: YES
GLOBULIN PLAS-MCNC: 2.8 G/DL (ref 2–3.5)
GLUCOSE BLD-MCNC: 114 MG/DL (ref 70–99)
HBA1C MFR BLD: 5.8 % (ref ?–5.7)
HCT VFR BLD AUTO: 47 %
HDLC SERPL-MCNC: 32 MG/DL (ref 40–59)
HGB BLD-MCNC: 15.4 G/DL
IMM GRANULOCYTES # BLD AUTO: 0.03 X10(3) UL (ref 0–1)
IMM GRANULOCYTES NFR BLD: 0.4 %
LDLC SERPL CALC-MCNC: 168 MG/DL (ref ?–100)
LYMPHOCYTES # BLD AUTO: 1.99 X10(3) UL (ref 1–4)
LYMPHOCYTES NFR BLD AUTO: 24.2 %
MCH RBC QN AUTO: 28.6 PG (ref 26–34)
MCHC RBC AUTO-ENTMCNC: 32.8 G/DL (ref 31–37)
MCV RBC AUTO: 87.4 FL
MONOCYTES # BLD AUTO: 0.59 X10(3) UL (ref 0.1–1)
MONOCYTES NFR BLD AUTO: 7.2 %
NEUTROPHILS # BLD AUTO: 5.19 X10 (3) UL (ref 1.5–7.7)
NEUTROPHILS # BLD AUTO: 5.19 X10(3) UL (ref 1.5–7.7)
NEUTROPHILS NFR BLD AUTO: 62.8 %
NONHDLC SERPL-MCNC: 190 MG/DL (ref ?–130)
OSMOLALITY SERPL CALC.SUM OF ELEC: 304 MOSM/KG (ref 275–295)
PLATELET # BLD AUTO: 300 10(3)UL (ref 150–450)
POTASSIUM SERPL-SCNC: 3.8 MMOL/L (ref 3.5–5.1)
PROT SERPL-MCNC: 7.3 G/DL (ref 5.7–8.2)
RBC # BLD AUTO: 5.38 X10(6)UL
SODIUM SERPL-SCNC: 145 MMOL/L (ref 136–145)
TRIGL SERPL-MCNC: 122 MG/DL (ref 30–149)
TSI SER-ACNC: 1.76 UIU/ML (ref 0.55–4.78)
VLDLC SERPL CALC-MCNC: 24 MG/DL (ref 0–30)
WBC # BLD AUTO: 8.2 X10(3) UL (ref 4–11)

## 2025-03-08 PROCEDURE — 80050 GENERAL HEALTH PANEL: CPT | Performed by: FAMILY MEDICINE

## 2025-03-08 PROCEDURE — 83036 HEMOGLOBIN GLYCOSYLATED A1C: CPT | Performed by: FAMILY MEDICINE

## 2025-03-08 PROCEDURE — 84153 ASSAY OF PSA TOTAL: CPT | Performed by: FAMILY MEDICINE

## 2025-03-08 PROCEDURE — 80061 LIPID PANEL: CPT | Performed by: FAMILY MEDICINE

## 2025-03-31 ENCOUNTER — TELEMEDICINE (OUTPATIENT)
Facility: CLINIC | Age: 55
End: 2025-03-31
Payer: COMMERCIAL

## 2025-03-31 DIAGNOSIS — G47.33 OBSTRUCTIVE SLEEP APNEA: Primary | ICD-10-CM

## 2025-03-31 DIAGNOSIS — E66.813 CLASS 3 SEVERE OBESITY DUE TO EXCESS CALORIES WITHOUT SERIOUS COMORBIDITY WITH BODY MASS INDEX (BMI) OF 45.0 TO 49.9 IN ADULT (HCC): ICD-10-CM

## 2025-03-31 DIAGNOSIS — E66.01 CLASS 3 SEVERE OBESITY DUE TO EXCESS CALORIES WITHOUT SERIOUS COMORBIDITY WITH BODY MASS INDEX (BMI) OF 45.0 TO 49.9 IN ADULT (HCC): ICD-10-CM

## 2025-03-31 PROCEDURE — 98005 SYNCH AUDIO-VIDEO EST LOW 20: CPT | Performed by: PHYSICIAN ASSISTANT

## 2025-03-31 NOTE — PROGRESS NOTES
ESDRAS YEBOAH  2025 - 2025  Patient ID: [id number]  : 1970  Age: 55 years  [facility]  Compliance Report  Usage 2025 - 2025  Usage days 57/59 days (97%)  >= 4 hours 56 days (95%)  < 4 hours 1 days (2%)  Usage hours 398 hours 7 minutes  Average usage (total days) 6 hours 45 minutes  Average usage (days used) 6 hours 59 minutes  Median usage (days used) 6 hours 44 minutes  Total used hours (value since last reset - 2025) 406 hours  AirSense 11 AutoSet  Serial number [id number]  Mode AutoSet  Min Pressure 11 cmH2O  Max Pressure 16 cmH2O  EPR Fulltime  EPR level 2  Response Standard  Therapy  Pressure - cmH2O Median: 14.0 95th percentile: 15.7 Maximum: 15.9  Leaks - L/min Median: 14.0 95th percentile: 23.2 Maximum: 31.6  Events per hour AI: 0.8 HI: 0.6 AHI: 1.4  Apnea Index Central: 0.2 Obstructive: 0.5 Unknown: 0.1  RERA Index 0.6  Cheyne-Hope respiration (average duration per night) 0 minutes (0%)

## 2025-03-31 NOTE — PROGRESS NOTES
[facility]  SLEEP PROGRESS NOTE        ESDRAS YEBOAH is a 55 year old male who presents today for telemedicine 31-90 day follow up           The following individual(s) verbally consented to be recorded using ambient AI listening technology and understand that they can each withdraw their consent to this listening technology at any point by asking the clinician to turn off or pause the recording:    Patient name: Esdras Yeboah Jr.  Additional names:  NA      History of Present Illness  The patient, with a history of severe sleep apnea, presents for a 31-90 day follow-up after receiving a new CPAP machine.     He reports significant improvement with the new machine, noting it is quieter and does not have the pressure issues he experienced with the old machine. He believes he is getting much better sleep with the new machine. He uses a full face mask, which he finds comfortable and has been using since he started CPAP therapy.    He occasionally experiences dry mouth in the morning, but it is not bothersome.       In bed 11p-12a  Out of bed 6a on weekdays, 8a on weekends  SL quick  Nighttime awakenings rare  Naps none  Caffeine 3 cans diet soda daily    Denies teeth grinding, leg cramps, restless legs, headaches, tinnitus, chest pain, thoracic back pain, bloating, drowsy driving, sleep walking, sleep talking    DME company: [employer]  Mask type: FFM     ESDRAS YEBOAH  2025 - 2025  Patient ID: [id number]  : 1970  Age: 55 years  [facility]  Compliance Report  Usage 2025 - 2025  Usage days 57/59 days (97%)  >= 4 hours 56 days (95%)  < 4 hours 1 days (2%)  Usage hours 398 hours 7 minutes  Average usage (total days) 6 hours 45 minutes  Average usage (days used) 6 hours 59 minutes  Median usage (days used) 6 hours 44 minutes  Total used hours (value since last reset - 2025) 406 hours  AirSense 11 AutoSet  Serial number [id number]  Mode AutoSet  Min Pressure 11 cmH2O  Max  Pressure 16 cmH2O  EPR Fulltime  EPR level 2  Response Standard  Therapy  Pressure - cmH2O Median: 14.0 95th percentile: 15.7 Maximum: 15.9  Leaks - L/min Median: 14.0 95th percentile: 23.2 Maximum: 31.6  Events per hour AI: 0.8 HI: 0.6 AHI: 1.4  Apnea Index Central: 0.2 Obstructive: 0.5 Unknown: 0.1  RERA Index 0.6  Cheyne-Hope respiration (average duration per night) 0 minutes (0%)       Patient: Sleep review of systems today: see form.      9/28/2019 Split Night  300#  The diagnostic portion of the study had lights out at 10:14 p.m. and lights on at 1:09 a.m.  Total sleep time was 118 minutes.  Sleep efficiency was reduced at 67.4%.  Sleep latency was 5 minutes.  Wake after sleep onset was 24 minutes.  During sleep, stages 1, 2, and REM sleep were seen.  Slow-wave sleep was not seen.  REM sleep occupied 8.9% of total sleep time with a REM latency of 120.5 minutes.  Sleep architecture was fragmented at times.  Sleep-disordered breathing was seen during the study and was severe.  Two hundred and eight apneas and hypopneas were tallied.  The overall AHI was 105.8 events per hour of sleep.  The supine index was 110.6, compared to a non-supine index of 98.5.  The REM AHI was 80, compared to a non-REM AHI of 107.7.  There were oxygen desaturations with an SaO2 yaneth of 73%.  The patient spent about 13.4% of recording time with oxygen saturation levels of 88% or below.  Loud to heroic snoring was noted during the study, but was not associated with arousals.  Due to the severity of his sleep-disordered breathing, a CPAP titration was initiated on the same night of polysomnography according to split-night protocol.  The CPAP titration portion of the study had lights out at 1:09 a.m. and lights on at 4:54 a.m.  Total sleep time was 167 minutes.  Sleep efficiency was 74.2%.  Sleep latency was 33.5 minutes.  Wake after sleep onset was 16 minutes.  During sleep, all stages of sleep were seen.  Slow-wave sleep comprised  34.1% of total sleep time.  REM sleep occupied 18% of total sleep time with a REM latency of 90.5 minutes.  The patient was started on CPAP at a pressure of 5, which was titrated upwards to eliminate sleep breathing events.  The patient was brought to a final pressure of 13.  Sleep-disordered breathing was under good control at this CPAP pressure, which was observed during supine REM sleep.  A CPAP pressure of 13 was sufficient to control sleep-disordered breathing and maintain oxygen saturation levels consistently in the 90s with an SaO2 yaneth of 91%.      Pt  PCP:  Annelise Shelley DO  No referring provider defined for this encounter.           No data to display                  Past Medical History:    Allergic rhinitis    Back problem    Lumbar     Diverticulitis    required surgery    Essential hypertension    High blood pressure    Hyperlipidemia    Obesity    Sleep apnea    CPAP    Stargardt's sensory retina degeneration    Visual impairment    glasses     Past Surgical History:   Procedure Laterality Date    Colonoscopy      Colonoscopy N/A 07/30/2021    Procedure: COLONOSCOPY;  Surgeon: Isaías Peng DO;  Location:  ENDOSCOPY    Lap, surg; colectomy, partial, w/anastomosis, w/coloproctostomy  2005    Other surgical history  2002    colon resection for diverticulitis     Social History:  Social History     Social History Narrative    Not on file     Social History     Socioeconomic History    Marital status:    Tobacco Use    Smoking status: Never    Smokeless tobacco: Never   Vaping Use    Vaping status: Never Used   Substance and Sexual Activity    Alcohol use: Yes     Alcohol/week: 1.0 standard drink of alcohol     Types: 1 Cans of beer per week     Comment: rarely    Drug use: No   Other Topics Concern    Caffeine Concern Yes    Exercise No     Family History:  Family History   Problem Relation Age of Onset    Hypertension Father     Diabetes Paternal Grandmother     Heart Disorder Paternal  Grandfather     Hearing loss Daughter     Hearing loss Son      Allergies:  Allergies[1]  Current Meds:  Current Outpatient Medications   Medication Sig Dispense Refill    metoprolol succinate  MG Oral Tablet 24 Hr Take 1 tablet (100 mg total) by mouth daily. 90 tablet 0    aspirin 81 MG Oral Tab Take 1 tablet (81 mg total) by mouth daily. 365 tablet 0    omega-3 fatty acids 1000 MG Oral Cap Take 1,000 mg by mouth daily. 90 capsule 3      Counseling given: Not Answered       Problem List:  Patient Active Problem List   Diagnosis    Stargardt's sensory retina degeneration    Hypertension    Impaired fasting glucose    Hypertriglyceridemia    Seborrheic keratosis    Elevated serum creatinine    Obstructive sleep apnea    Arthritis of neck    Herniated nucleus pulposus, C6-7 left    Mixed hyperlipidemia    Class 3 severe obesity due to excess calories without serious comorbidity with body mass index (BMI) of 45.0 to 49.9 in adult (HCC)    Primary superficial basal cell carcinoma (BCC)       REVIEW OF SYSTEMS:   Review of Systems  See HPI     EXAM:   There were no vitals taken for this visit. Estimated body mass index is 46.2 kg/m² as calculated from the following:    Height as of 3/5/25: 5' 7\" (1.702 m).    Weight as of 3/5/25: 295 lb (133.8 kg).   Neck in inches:      Wt Readings from Last 6 Encounters:   03/05/25 295 lb (133.8 kg)   02/14/24 297 lb (134.7 kg)   09/07/23 293 lb (132.9 kg)   09/01/21 291 lb (132 kg)   08/23/21 285 lb (129.3 kg)   07/30/21 285 lb (129.3 kg)     BP Readings from Last 3 Encounters:   03/05/25 (!) 184/98   02/14/24 130/70   09/07/23 138/78     Pulse Readings from Last 3 Encounters:   03/05/25 59   02/14/24 54   09/07/23 64     SpO2 Readings from Last 3 Encounters:   03/05/25 98%   02/14/24 100%   09/07/23 96%      Patient weight not recorded    Vital signs reviewed.  Physical Exam  Vitals and nursing note reviewed.   Constitutional:       Appearance: Normal appearance.   HENT:       Head: Normocephalic and atraumatic.      Right Ear: External ear normal.      Left Ear: External ear normal.   Pulmonary:      Effort: Pulmonary effort is normal. No respiratory distress.   Musculoskeletal:      Cervical back: Normal range of motion and neck supple.   Neurological:      General: No focal deficit present.      Mental Status: He is alert and oriented to person, place, and time.   Psychiatric:         Attention and Perception: Attention and perception normal.         Mood and Affect: Mood and affect normal.         Speech: Speech normal.         Behavior: Behavior normal. Behavior is cooperative.         Thought Content: Thought content normal.         Cognition and Memory: Cognition and memory normal.         Judgment: Judgment normal.            Assessment & Plan  Obstructive Sleep Apnea  Severe obstructive sleep apnea with a previous sleep study showing 106 apneic events per hour in 2019. Recent switch to a new CPAP machine has significantly improved his condition, reducing apneic events to 1.4 per hour. Reports improved sleep quality with the new, quieter machine, resolving previous pressure issues. Uses a full face mask with no significant air leaks. Current CPAP settings are between 11 and 16 cm H2O, with most usage at 15.7 cm H2O or below. Denies needing more air or pressure.  - Continue CPAP therapy with settings between 11 and 16 cm H2O.  - Schedule follow-up in one year, with earlier contact if issues arise.    Patient is using and benefiting from regular cpap use.  Patient was instructed to clean equipment on a weekly basis.  Patient was instructed to keep up to date with supplies.  Patient was informed to avoid drowsy driving, or using heavy machinery.  Patient was instructed to followup on a annual basis.    Obesity  Current weight of 290 lbs (pt reported) contributes to obstructive sleep apnea. Discussed potential benefit of weight loss leading to discontinuation of antihypertensive  medication.  - Discuss potential use of Zepbound with primary care physician, considering current antihypertensive medication.      There are no Patient Instructions on file for this visit.    Advised if still with sleep apnea and not using CPAP has a  7 fold increase in risk of heart attack, stroke, abnormal heart rhythm  and death,  increased risk of driving accidents.   Advised to refrain from driving when sleepy.    COMPLIANCE is required by insurance for 4 hours a night most nights of the week.  Recommend weight loss, and maintain and optimal BMI with Exercise 30 minutes most days of the week to target heart rate .     Advised patient to change filters,masks,hoses  and tubes and equiptment on a  regular schedule.  Filters and seals shall be changed every 1 month,  Hoses every 3 months,   CPAP mask and humidifier  chamber changed every 6 month  with the Durable medical equipment provider.       Due to covid crisis this visit was carried out electronically by  [place]    “Please note that the following visit was completed using two-way, real-time interactive audio and/or video  Or audio alone communication after patient consent.  This has been done in good franki to provide continuity of care in the best interest of the provider-patient relationship, due to the ongoing national public health crisis/national emergency and because of restrictions of visitation.  There are limitations of this visit as an adequate physical exam could not be performed.  Every conscious effort was taken to allow for sufficient and adequate time.  This billing was spent on reviewing labs,  personal device downloads, medications, radiology tests and decision making.  Appropriate medical decision-making and tests are ordered as detailed in the plan of care”      Meds & Refills for this Visit:  Requested Prescriptions      No prescriptions requested or ordered in this encounter       Outcome: Parent verbalizes understanding. Parent is notified  to call with any questions, complications, allergies, or worsening or changing symptoms.  Parent is to call with any side effects or complications from the treatments as a result of today.     This visit is conducted using Telemedicine with live, interactive video and audio.    Patient has been referred to the Counts include 234 beds at the [facility] website at [email or web address] to review the yearly Consent to Treat document.    Patient understands and accepts financial responsibility for any deductible, co-insurance and/or co-pays associated with this service.      \" This note was created utilizing Dragon speech recognition software.  Please excuse any grammatical errors. Call my office if you have any questions regarding this note. \"     Elva Early PA-C           [1]   Allergies  Allergen Reactions    Hydrochlorothiazide OTHER (SEE COMMENTS)     Gout flares every 3 weeks.    Lisinopril Coughing

## 2025-04-06 ENCOUNTER — PATIENT MESSAGE (OUTPATIENT)
Dept: FAMILY MEDICINE CLINIC | Facility: CLINIC | Age: 55
End: 2025-04-06

## 2025-04-07 RX ORDER — TIRZEPATIDE 2.5 MG/.5ML
2.5 INJECTION, SOLUTION SUBCUTANEOUS WEEKLY
Qty: 2 ML | Refills: 0 | Status: SHIPPED | OUTPATIENT
Start: 2025-04-07 | End: 2025-04-29

## 2025-05-05 DIAGNOSIS — I10 ESSENTIAL HYPERTENSION: ICD-10-CM

## 2025-05-05 NOTE — TELEPHONE ENCOUNTER
.A refill request was received for:  Requested Prescriptions     Pending Prescriptions Disp Refills    METOPROLOL SUCCINATE  MG Oral Tablet 24 Hr [Pharmacy Med Name: METOPROLOL SUCC  MG TAB] 90 tablet 0     Sig: TAKE 1 TABLET BY MOUTH EVERY DAY       Last refill date:   2/3/25    Last office visit: 3/5/25    Follow up due:  No future appointments.

## 2025-05-07 RX ORDER — METOPROLOL SUCCINATE 100 MG/1
100 TABLET, EXTENDED RELEASE ORAL DAILY
Qty: 90 TABLET | Refills: 0 | Status: SHIPPED | OUTPATIENT
Start: 2025-05-07

## 2025-08-03 DIAGNOSIS — I10 ESSENTIAL HYPERTENSION: ICD-10-CM

## 2025-08-04 RX ORDER — METOPROLOL SUCCINATE 100 MG/1
100 TABLET, EXTENDED RELEASE ORAL DAILY
Qty: 90 TABLET | Refills: 0 | Status: SHIPPED | OUTPATIENT
Start: 2025-08-04

## (undated) DEVICE — FILTERLINE NASAL ADULT O2/CO2

## (undated) DEVICE — ENDOSCOPY PACK - LOWER: Brand: MEDLINE INDUSTRIES, INC.

## (undated) DEVICE — 1200CC GUARDIAN II: Brand: GUARDIAN

## (undated) DEVICE — Device: Brand: DEFENDO AIR/WATER/SUCTION AND BIOPSY VALVE

## (undated) DEVICE — MEDI-VAC SUCTION HANDLE REGULAR CAPACITY: Brand: CARDINAL HEALTH

## (undated) DEVICE — MEDI-VAC NON-CONDUCTIVE SUCTION TUBING: Brand: CARDINAL HEALTH

## (undated) NOTE — Clinical Note
05/22/2017        [address]  [id number]      Dear Mere Cooley records indicate that you have outstanding lab work and or testing that was ordered for you and has not yet been completed:      Comp Metabolic Panel

## (undated) NOTE — MR AVS SNAPSHOT
[address]  [address] [facility], [place] [id number] [phone]               Thank you for choosing us for your health care visit with Alisha Harrison MD.  We are glad to serve you and happy to provide you with this Take 1 tablet (100 mg total) by mouth daily. What changed:    - medication strength  - how much to take   Commonly known as: Toprol XL           omega-3 fatty acids 1000 MG Caps   Take 1,000 mg by mouth daily.    Commonly known as:  FISH OIL           Tr Don’t eat while when you’re bored.      EAT THESE FOODS MORE OFTEN: EAT THESE FOODS LESS OFTEN:   Make half your plate fruits and vegetables Highly refined, white starches including white bread, rice and pasta   Eat plenty of protein, keep the fat content l